# Patient Record
Sex: FEMALE | Race: WHITE | NOT HISPANIC OR LATINO | Employment: FULL TIME | URBAN - METROPOLITAN AREA
[De-identification: names, ages, dates, MRNs, and addresses within clinical notes are randomized per-mention and may not be internally consistent; named-entity substitution may affect disease eponyms.]

---

## 2018-11-29 LAB
EXTERNAL CHLAMYDIA SCREEN: NEGATIVE
EXTERNAL GONORRHEA SCREEN: NEGATIVE
EXTERNAL HEPATITIS B SURFACE ANTIGEN: NEGATIVE
EXTERNAL HEPATITIS C AB: NEGATIVE
EXTERNAL RUBELLA QUALITATIVE: NORMAL
EXTERNAL SYPHILIS RPR SCREEN: NORMAL

## 2018-11-30 LAB — EXTERNAL URINE DRUG SCREEN: NEGATIVE

## 2019-02-18 ENCOUNTER — TRANSCRIBE ORDERS (OUTPATIENT)
Dept: OBSTETRICS AND GYNECOLOGY | Facility: HOSPITAL | Age: 24
End: 2019-02-18

## 2019-02-18 DIAGNOSIS — O28.3 ECHOGENIC INTRACARDIAC FOCUS OF FETUS ON PRENATAL ULTRASOUND: Primary | ICD-10-CM

## 2019-03-18 ENCOUNTER — OFFICE VISIT (OUTPATIENT)
Dept: OBSTETRICS AND GYNECOLOGY | Facility: HOSPITAL | Age: 24
End: 2019-03-18

## 2019-03-18 ENCOUNTER — HOSPITAL ENCOUNTER (OUTPATIENT)
Dept: WOMENS IMAGING | Facility: HOSPITAL | Age: 24
Discharge: HOME | End: 2019-03-18
Admitting: OBSTETRICS & GYNECOLOGY

## 2019-03-18 VITALS
HEIGHT: 64 IN | SYSTOLIC BLOOD PRESSURE: 128 MMHG | BODY MASS INDEX: 24.41 KG/M2 | DIASTOLIC BLOOD PRESSURE: 85 MMHG | WEIGHT: 143 LBS

## 2019-03-18 DIAGNOSIS — O35.BXX0 ECHOGENIC FOCUS OF HEART OF FETUS AFFECTING ANTEPARTUM CARE OF MOTHER, SINGLE OR UNSPECIFIED FETUS: Primary | ICD-10-CM

## 2019-03-18 DIAGNOSIS — O28.3 ECHOGENIC INTRACARDIAC FOCUS OF FETUS ON PRENATAL ULTRASOUND: ICD-10-CM

## 2019-03-18 DIAGNOSIS — Z3A.22 22 WEEKS GESTATION OF PREGNANCY: ICD-10-CM

## 2019-03-18 PROCEDURE — 76811 OB US DETAILED SNGL FETUS: CPT | Performed by: OBSTETRICS & GYNECOLOGY

## 2019-03-18 PROCEDURE — 76811 OB US DETAILED SNGL FETUS: CPT

## 2019-03-18 RX ORDER — PRENATAL WITH FERROUS FUM AND FOLIC ACID 3080; 920; 120; 400; 22; 1.84; 3; 20; 10; 1; 12; 200; 27; 25; 2 [IU]/1; [IU]/1; MG/1; [IU]/1; MG/1; MG/1; MG/1; MG/1; MG/1; MG/1; UG/1; MG/1; MG/1; MG/1; MG/1
1 TABLET ORAL DAILY
COMMUNITY
End: 2022-10-06

## 2019-03-18 NOTE — PROGRESS NOTES
Documentation of the ultasound findings, images, and interpretations with be available in the patient's Viewpoint report located in the Chart Review Imaging tab in [facility].

## 2019-04-20 LAB — EXTERNAL GLUCOSE TOLERANCE TEST FASTING: 102 MG/DL

## 2019-06-20 ENCOUNTER — TRANSCRIBE ORDERS (OUTPATIENT)
Dept: LAB | Facility: HOSPITAL | Age: 24
End: 2019-06-20

## 2019-06-20 ENCOUNTER — LAB (OUTPATIENT)
Dept: LAB | Facility: HOSPITAL | Age: 24
End: 2019-06-20

## 2019-06-20 DIAGNOSIS — Z34.03 ENCOUNTER FOR SUPERVISION OF NORMAL FIRST PREGNANCY IN THIRD TRIMESTER: Primary | ICD-10-CM

## 2019-06-20 DIAGNOSIS — Z34.03 ENCOUNTER FOR SUPERVISION OF NORMAL FIRST PREGNANCY IN THIRD TRIMESTER: ICD-10-CM

## 2019-06-20 PROCEDURE — 87081 CULTURE SCREEN ONLY: CPT

## 2019-06-22 LAB — BACTERIA SPEC AEROBE CULT: NORMAL

## 2019-07-18 NOTE — H&P
23-year-old G1, P0 at 40 weeks and 1 day patient admitted last night for Watters bulb cervical ripening and induction.  Patient was probably in labor on her own when she came in.  We did not have to start Pitocin; but after they remove the Watters bulb she did have episode of fetal bradycardia, probably due to uterine hypertonus.  This recovered and responded well to position change and subcu terbutaline.    Patient progressed without Pitocin to 9 cm and +1 station.  Fetus did develop another episode of bradycardia down to 60.  This did not recover with position change.  We decided to go ahead and do a stat section.    PMH:No Major illness  No surgeries  Allergies: Penicillin    Afeb VSS  Ht- RR  Lungs- Clear  Abd- soft nontender  Uterus- appropriate for gestational age     A/P uterine pregnancy at 40 weeks and 1 day, and in active labor         Episode of fetal bradycardia did not resolve to position change or Brethine         Proceeded with a stat , she is already had an epidural in place and a Watters catheter

## 2019-07-19 ENCOUNTER — HOSPITAL ENCOUNTER (OUTPATIENT)
Dept: LABOR AND DELIVERY | Facility: HOSPITAL | Age: 24
Discharge: HOME | End: 2019-07-19

## 2019-07-19 ENCOUNTER — HOSPITAL ENCOUNTER (INPATIENT)
Facility: HOSPITAL | Age: 24
LOS: 5 days | Discharge: HOME | End: 2019-07-24
Attending: OBSTETRICS & GYNECOLOGY | Admitting: OBSTETRICS & GYNECOLOGY

## 2019-07-19 PROBLEM — Z34.90 PREGNANCY: Status: ACTIVE | Noted: 2019-07-19

## 2019-07-19 LAB
ABO GROUP BLD: NORMAL
BLD GP AB SCN SERPL QL: NEGATIVE
DEPRECATED RDW RBC AUTO: 43.3 FL (ref 37–54)
ERYTHROCYTE [DISTWIDTH] IN BLOOD BY AUTOMATED COUNT: 12.4 % (ref 12.3–15.4)
HCT VFR BLD AUTO: 38 % (ref 34–46.6)
HGB BLD-MCNC: 12.9 G/DL (ref 12–15.9)
MCH RBC QN AUTO: 32.8 PG (ref 26.6–33)
MCHC RBC AUTO-ENTMCNC: 33.9 G/DL (ref 31.5–35.7)
MCV RBC AUTO: 96.7 FL (ref 79–97)
PLATELET # BLD AUTO: 251 10*3/MM3 (ref 140–450)
PMV BLD AUTO: 11.9 FL (ref 6–12)
RBC # BLD AUTO: 3.93 10*6/MM3 (ref 3.77–5.28)
RH BLD: NEGATIVE
T&S EXPIRATION DATE: NORMAL
WBC NRBC COR # BLD: 10.12 10*3/MM3 (ref 3.4–10.8)

## 2019-07-19 PROCEDURE — 86901 BLOOD TYPING SEROLOGIC RH(D): CPT | Performed by: OBSTETRICS & GYNECOLOGY

## 2019-07-19 PROCEDURE — 25010000002 ONDANSETRON PER 1 MG: Performed by: OBSTETRICS & GYNECOLOGY

## 2019-07-19 PROCEDURE — 86900 BLOOD TYPING SEROLOGIC ABO: CPT | Performed by: OBSTETRICS & GYNECOLOGY

## 2019-07-19 PROCEDURE — 86850 RBC ANTIBODY SCREEN: CPT | Performed by: OBSTETRICS & GYNECOLOGY

## 2019-07-19 PROCEDURE — 86900 BLOOD TYPING SEROLOGIC ABO: CPT

## 2019-07-19 PROCEDURE — 59200 INSERT CERVICAL DILATOR: CPT | Performed by: OBSTETRICS & GYNECOLOGY

## 2019-07-19 PROCEDURE — 85027 COMPLETE CBC AUTOMATED: CPT | Performed by: OBSTETRICS & GYNECOLOGY

## 2019-07-19 PROCEDURE — 86901 BLOOD TYPING SEROLOGIC RH(D): CPT

## 2019-07-19 RX ORDER — PROMETHAZINE HYDROCHLORIDE 25 MG/ML
12.5 INJECTION, SOLUTION INTRAMUSCULAR; INTRAVENOUS EVERY 6 HOURS PRN
Status: CANCELLED | OUTPATIENT
Start: 2019-07-19

## 2019-07-19 RX ORDER — MISOPROSTOL 100 MCG
25 TABLET ORAL ONCE
Status: CANCELLED | OUTPATIENT
Start: 2019-07-19

## 2019-07-19 RX ORDER — SODIUM CHLORIDE 0.9 % (FLUSH) 0.9 %
3-10 SYRINGE (ML) INJECTION AS NEEDED
Status: CANCELLED | OUTPATIENT
Start: 2019-07-19

## 2019-07-19 RX ORDER — PROMETHAZINE HYDROCHLORIDE 12.5 MG/1
12.5 TABLET ORAL EVERY 6 HOURS PRN
Status: CANCELLED | OUTPATIENT
Start: 2019-07-19

## 2019-07-19 RX ORDER — ONDANSETRON 2 MG/ML
4 INJECTION INTRAMUSCULAR; INTRAVENOUS EVERY 6 HOURS PRN
Status: DISCONTINUED | OUTPATIENT
Start: 2019-07-19 | End: 2019-07-20

## 2019-07-19 RX ORDER — SODIUM CHLORIDE 0.9 % (FLUSH) 0.9 %
3 SYRINGE (ML) INJECTION EVERY 12 HOURS SCHEDULED
Status: CANCELLED | OUTPATIENT
Start: 2019-07-19

## 2019-07-19 RX ORDER — LIDOCAINE HYDROCHLORIDE 10 MG/ML
5 INJECTION, SOLUTION EPIDURAL; INFILTRATION; INTRACAUDAL; PERINEURAL AS NEEDED
Status: CANCELLED | OUTPATIENT
Start: 2019-07-19

## 2019-07-19 RX ORDER — OXYTOCIN-SODIUM CHLORIDE 0.9% IV SOLN 30 UNIT/500ML 30-0.9/5 UT/ML-%
2-30 SOLUTION INTRAVENOUS
Status: CANCELLED | OUTPATIENT
Start: 2019-07-20

## 2019-07-19 RX ORDER — IBUPROFEN 600 MG/1
600 TABLET ORAL EVERY 6 HOURS PRN
Status: CANCELLED | OUTPATIENT
Start: 2019-07-19

## 2019-07-19 RX ORDER — SODIUM CHLORIDE 0.9 % (FLUSH) 0.9 %
3 SYRINGE (ML) INJECTION EVERY 12 HOURS SCHEDULED
Status: DISCONTINUED | OUTPATIENT
Start: 2019-07-19 | End: 2019-07-21

## 2019-07-19 RX ORDER — PROMETHAZINE HYDROCHLORIDE 25 MG/ML
12.5 INJECTION, SOLUTION INTRAMUSCULAR; INTRAVENOUS EVERY 6 HOURS PRN
Status: DISCONTINUED | OUTPATIENT
Start: 2019-07-19 | End: 2019-07-20 | Stop reason: SDUPTHER

## 2019-07-19 RX ORDER — PROMETHAZINE HYDROCHLORIDE 12.5 MG/1
12.5 SUPPOSITORY RECTAL EVERY 6 HOURS PRN
Status: DISCONTINUED | OUTPATIENT
Start: 2019-07-19 | End: 2019-07-20 | Stop reason: SDUPTHER

## 2019-07-19 RX ORDER — PROMETHAZINE HYDROCHLORIDE 12.5 MG/1
12.5 SUPPOSITORY RECTAL EVERY 6 HOURS PRN
Status: CANCELLED | OUTPATIENT
Start: 2019-07-19

## 2019-07-19 RX ORDER — MISOPROSTOL 100 MCG
25 TABLET ORAL ONCE
Status: DISCONTINUED | OUTPATIENT
Start: 2019-07-19 | End: 2019-07-19

## 2019-07-19 RX ORDER — HYDROCODONE BITARTRATE AND ACETAMINOPHEN 5; 325 MG/1; MG/1
2 TABLET ORAL EVERY 4 HOURS PRN
Status: CANCELLED | OUTPATIENT
Start: 2019-07-19 | End: 2019-07-29

## 2019-07-19 RX ORDER — OXYTOCIN-SODIUM CHLORIDE 0.9% IV SOLN 30 UNIT/500ML 30-0.9/5 UT/ML-%
2-30 SOLUTION INTRAVENOUS
Status: DISCONTINUED | OUTPATIENT
Start: 2019-07-20 | End: 2019-07-23

## 2019-07-19 RX ORDER — TERBUTALINE SULFATE 1 MG/ML
0.25 INJECTION, SOLUTION SUBCUTANEOUS ONCE
Status: COMPLETED | OUTPATIENT
Start: 2019-07-20 | End: 2019-07-19

## 2019-07-19 RX ORDER — PROMETHAZINE HYDROCHLORIDE 12.5 MG/1
12.5 TABLET ORAL EVERY 6 HOURS PRN
Status: DISCONTINUED | OUTPATIENT
Start: 2019-07-19 | End: 2019-07-20 | Stop reason: SDUPTHER

## 2019-07-19 RX ORDER — SODIUM CHLORIDE 0.9 % (FLUSH) 0.9 %
3-10 SYRINGE (ML) INJECTION AS NEEDED
Status: DISCONTINUED | OUTPATIENT
Start: 2019-07-19 | End: 2019-07-21

## 2019-07-19 RX ORDER — LIDOCAINE HYDROCHLORIDE 10 MG/ML
5 INJECTION, SOLUTION EPIDURAL; INFILTRATION; INTRACAUDAL; PERINEURAL AS NEEDED
Status: DISCONTINUED | OUTPATIENT
Start: 2019-07-19 | End: 2019-07-23

## 2019-07-19 RX ORDER — SODIUM CHLORIDE, SODIUM LACTATE, POTASSIUM CHLORIDE, CALCIUM CHLORIDE 600; 310; 30; 20 MG/100ML; MG/100ML; MG/100ML; MG/100ML
125 INJECTION, SOLUTION INTRAVENOUS CONTINUOUS
Status: DISCONTINUED | OUTPATIENT
Start: 2019-07-19 | End: 2019-07-23

## 2019-07-19 RX ADMIN — ONDANSETRON 4 MG: 2 INJECTION INTRAMUSCULAR; INTRAVENOUS at 22:49

## 2019-07-19 RX ADMIN — SODIUM CHLORIDE, POTASSIUM CHLORIDE, SODIUM LACTATE AND CALCIUM CHLORIDE 125 ML/HR: 600; 310; 30; 20 INJECTION, SOLUTION INTRAVENOUS at 21:28

## 2019-07-19 RX ADMIN — TERBUTALINE SULFATE 0.25 MG: 1 INJECTION SUBCUTANEOUS at 22:22

## 2019-07-19 RX ADMIN — SODIUM CHLORIDE, PRESERVATIVE FREE 3 ML: 5 INJECTION INTRAVENOUS at 21:29

## 2019-07-20 ENCOUNTER — ANESTHESIA (OUTPATIENT)
Dept: LABOR AND DELIVERY | Facility: HOSPITAL | Age: 24
End: 2019-07-20

## 2019-07-20 ENCOUNTER — ANESTHESIA EVENT (OUTPATIENT)
Dept: LABOR AND DELIVERY | Facility: HOSPITAL | Age: 24
End: 2019-07-20

## 2019-07-20 ENCOUNTER — APPOINTMENT (OUTPATIENT)
Dept: GENERAL RADIOLOGY | Facility: HOSPITAL | Age: 24
End: 2019-07-20

## 2019-07-20 LAB
ABO GROUP BLD: NORMAL
ALBUMIN SERPL-MCNC: 2.6 G/DL (ref 3.5–5.2)
ALBUMIN/GLOB SERPL: 1 G/DL
ALP SERPL-CCNC: 133 U/L (ref 39–117)
ALT SERPL W P-5'-P-CCNC: 10 U/L (ref 1–33)
ANION GAP SERPL CALCULATED.3IONS-SCNC: 12 MMOL/L (ref 5–15)
ARTERIAL PATENCY WRIST A: ABNORMAL
AST SERPL-CCNC: 19 U/L (ref 1–32)
ATMOSPHERIC PRESS: ABNORMAL MMHG
ATMOSPHERIC PRESS: ABNORMAL MMHG
BASE EXCESS BLDA CALC-SCNC: -11.5 MMOL/L (ref 0–2)
BASE EXCESS BLDCOA CALC-SCNC: -15.9 MMOL/L (ref 0–2)
BASOPHILS # BLD AUTO: 0.02 10*3/MM3 (ref 0–0.2)
BASOPHILS NFR BLD AUTO: 0.1 % (ref 0–1.5)
BDY SITE: ABNORMAL
BILIRUB SERPL-MCNC: 0.2 MG/DL (ref 0.2–1.2)
BODY TEMPERATURE: 37 C
BODY TEMPERATURE: 37 C
BUN BLD-MCNC: 5 MG/DL (ref 6–20)
BUN/CREAT SERPL: 12.8 (ref 7–25)
CALCIUM SPEC-SCNC: 8.6 MG/DL (ref 8.6–10.5)
CHLORIDE SERPL-SCNC: 103 MMOL/L (ref 98–107)
CO2 BLDA-SCNC: 19.3 MMOL/L (ref 23–27)
CO2 BLDA-SCNC: 20.9 MMOL/L (ref 23–27)
CO2 SERPL-SCNC: 22 MMOL/L (ref 22–29)
COHGB MFR BLD: 1.3 % (ref 0–2)
CREAT BLD-MCNC: 0.39 MG/DL (ref 0.57–1)
DEPRECATED RDW RBC AUTO: 43.8 FL (ref 37–54)
DEPRECATED RDW RBC AUTO: 44.1 FL (ref 37–54)
DEPRECATED RDW RBC AUTO: 44.7 FL (ref 37–54)
EOSINOPHIL # BLD AUTO: 0 10*3/MM3 (ref 0–0.4)
EOSINOPHIL NFR BLD AUTO: 0 % (ref 0.3–6.2)
ERYTHROCYTE [DISTWIDTH] IN BLOOD BY AUTOMATED COUNT: 12.4 % (ref 12.3–15.4)
ERYTHROCYTE [DISTWIDTH] IN BLOOD BY AUTOMATED COUNT: 12.5 % (ref 12.3–15.4)
ERYTHROCYTE [DISTWIDTH] IN BLOOD BY AUTOMATED COUNT: 12.5 % (ref 12.3–15.4)
FETAL BLEED: NEGATIVE
GFR SERPL CREATININE-BSD FRML MDRD: >150 ML/MIN/1.73
GLOBULIN UR ELPH-MCNC: 2.7 GM/DL
GLUCOSE BLD-MCNC: 94 MG/DL (ref 65–99)
HCO3 BLDA-SCNC: 17.7 MMOL/L (ref 20–26)
HCO3 BLDCOA-SCNC: 18.3 MMOL/L (ref 16.9–20.5)
HCT VFR BLD AUTO: 31.1 % (ref 34–46.6)
HCT VFR BLD AUTO: 31.3 % (ref 34–46.6)
HCT VFR BLD AUTO: 31.5 % (ref 34–46.6)
HCT VFR BLD CALC: 53.1 %
HGB BLD-MCNC: 10.5 G/DL (ref 12–15.9)
HGB BLDA-MCNC: 17 G/DL (ref 14–18)
HGB BLDA-MCNC: 17.3 G/DL (ref 14–18)
HOROWITZ INDEX BLD+IHG-RTO: 21 %
HOROWITZ INDEX BLD+IHG-RTO: 21 %
IMM GRANULOCYTES # BLD AUTO: 0.16 10*3/MM3 (ref 0–0.05)
IMM GRANULOCYTES NFR BLD AUTO: 0.8 % (ref 0–0.5)
LYMPHOCYTES # BLD AUTO: 1.38 10*3/MM3 (ref 0.7–3.1)
LYMPHOCYTES NFR BLD AUTO: 6.8 % (ref 19.6–45.3)
Lab: ABNORMAL
Lab: ABNORMAL
MCH RBC QN AUTO: 32.3 PG (ref 26.6–33)
MCH RBC QN AUTO: 33.4 PG (ref 26.6–33)
MCH RBC QN AUTO: 34.1 PG (ref 26.6–33)
MCHC RBC AUTO-ENTMCNC: 33.3 G/DL (ref 31.5–35.7)
MCHC RBC AUTO-ENTMCNC: 33.5 G/DL (ref 31.5–35.7)
MCHC RBC AUTO-ENTMCNC: 33.8 G/DL (ref 31.5–35.7)
MCV RBC AUTO: 100.3 FL (ref 79–97)
MCV RBC AUTO: 101 FL (ref 79–97)
MCV RBC AUTO: 96.3 FL (ref 79–97)
METHGB BLD QL: 1.2 % (ref 0–1.5)
MODALITY: ABNORMAL
MODALITY: ABNORMAL
MONOCYTES # BLD AUTO: 1.36 10*3/MM3 (ref 0.1–0.9)
MONOCYTES NFR BLD AUTO: 6.7 % (ref 5–12)
NEUTROPHILS # BLD AUTO: 17.34 10*3/MM3 (ref 1.7–7)
NEUTROPHILS NFR BLD AUTO: 85.6 % (ref 42.7–76)
NOTE: ABNORMAL
NOTE: ABNORMAL
NOTIFIED BY: ABNORMAL
NOTIFIED BY: ABNORMAL
NOTIFIED WHO: ABNORMAL
NOTIFIED WHO: ABNORMAL
NRBC BLD AUTO-RTO: 0 /100 WBC (ref 0–0.2)
NUMBER OF DOSES: NORMAL
OXYHGB MFR BLDV: 75.6 % (ref 94–99)
PCO2 BLDA: 51 MM HG (ref 35–45)
PCO2 BLDCOA: 83.5 MMHG (ref 43.3–54.9)
PCO2 TEMP ADJ BLD: 51 MM HG (ref 35–45)
PH BLDA: 7.15 PH UNITS (ref 7.35–7.45)
PH BLDCOA: 6.95 PH UNITS (ref 7.22–7.3)
PH, TEMP CORRECTED: 7.15 PH UNITS
PLATELET # BLD AUTO: 195 10*3/MM3 (ref 140–450)
PLATELET # BLD AUTO: 199 10*3/MM3 (ref 140–450)
PLATELET # BLD AUTO: 201 10*3/MM3 (ref 140–450)
PMV BLD AUTO: 11.7 FL (ref 6–12)
PMV BLD AUTO: 12 FL (ref 6–12)
PMV BLD AUTO: 12.4 FL (ref 6–12)
PO2 BLDA: 42.9 MM HG (ref 83–108)
PO2 BLDCOA: 52.3 MMHG (ref 11.5–43.3)
PO2 TEMP ADJ BLD: 42.9 MM HG (ref 83–108)
POTASSIUM BLD-SCNC: 4 MMOL/L (ref 3.5–5.2)
PROT SERPL-MCNC: 5.3 G/DL (ref 6–8.5)
RBC # BLD AUTO: 3.08 10*6/MM3 (ref 3.77–5.28)
RBC # BLD AUTO: 3.14 10*6/MM3 (ref 3.77–5.28)
RBC # BLD AUTO: 3.25 10*6/MM3 (ref 3.77–5.28)
RH BLD: NEGATIVE
SAO2 % BLDCOA: 35.9 %
SODIUM BLD-SCNC: 137 MMOL/L (ref 136–145)
VENTILATOR MODE: ABNORMAL
WBC NRBC COR # BLD: 18.28 10*3/MM3 (ref 3.4–10.8)
WBC NRBC COR # BLD: 20.26 10*3/MM3 (ref 3.4–10.8)
WBC NRBC COR # BLD: 20.31 10*3/MM3 (ref 3.4–10.8)

## 2019-07-20 PROCEDURE — 59025 FETAL NON-STRESS TEST: CPT

## 2019-07-20 PROCEDURE — 85025 COMPLETE CBC W/AUTO DIFF WBC: CPT | Performed by: OBSTETRICS & GYNECOLOGY

## 2019-07-20 PROCEDURE — 25010000002 ONDANSETRON PER 1 MG: Performed by: ANESTHESIOLOGY

## 2019-07-20 PROCEDURE — 25010000002 TERBUTALINE PER 1 MG: Performed by: OBSTETRICS & GYNECOLOGY

## 2019-07-20 PROCEDURE — 74018 RADEX ABDOMEN 1 VIEW: CPT

## 2019-07-20 PROCEDURE — 85027 COMPLETE CBC AUTOMATED: CPT | Performed by: OBSTETRICS & GYNECOLOGY

## 2019-07-20 PROCEDURE — 82805 BLOOD GASES W/O2 SATURATION: CPT

## 2019-07-20 PROCEDURE — C1755 CATHETER, INTRASPINAL: HCPCS

## 2019-07-20 PROCEDURE — 25010000003 MORPHINE PER 10 MG: Performed by: ANESTHESIOLOGY

## 2019-07-20 PROCEDURE — 86900 BLOOD TYPING SEROLOGIC ABO: CPT | Performed by: OBSTETRICS & GYNECOLOGY

## 2019-07-20 PROCEDURE — 25010000002 FENTANYL CITRATE (PF) 100 MCG/2ML SOLUTION: Performed by: ANESTHESIOLOGY

## 2019-07-20 PROCEDURE — 51702 INSERT TEMP BLADDER CATH: CPT

## 2019-07-20 PROCEDURE — 36600 WITHDRAWAL OF ARTERIAL BLOOD: CPT

## 2019-07-20 PROCEDURE — 86901 BLOOD TYPING SEROLOGIC RH(D): CPT | Performed by: OBSTETRICS & GYNECOLOGY

## 2019-07-20 PROCEDURE — 25010000002 CHLOROPROCAINE HCL (PF) 3 % SOLUTION: Performed by: ANESTHESIOLOGY

## 2019-07-20 PROCEDURE — 85461 HEMOGLOBIN FETAL: CPT | Performed by: OBSTETRICS & GYNECOLOGY

## 2019-07-20 PROCEDURE — 88307 TISSUE EXAM BY PATHOLOGIST: CPT | Performed by: OBSTETRICS & GYNECOLOGY

## 2019-07-20 PROCEDURE — 80053 COMPREHEN METABOLIC PANEL: CPT | Performed by: OBSTETRICS & GYNECOLOGY

## 2019-07-20 PROCEDURE — C1755 CATHETER, INTRASPINAL: HCPCS | Performed by: ANESTHESIOLOGY

## 2019-07-20 PROCEDURE — 25010000002 MIDAZOLAM PER 1 MG: Performed by: ANESTHESIOLOGY

## 2019-07-20 PROCEDURE — 25010000002 RHO D IMMUNE GLOBULIN 1500 UNIT/2ML SOLUTION PREFILLED SYRINGE: Performed by: OBSTETRICS & GYNECOLOGY

## 2019-07-20 PROCEDURE — 25010000003 CEFAZOLIN IN DEXTROSE 2-4 GM/100ML-% SOLUTION: Performed by: OBSTETRICS & GYNECOLOGY

## 2019-07-20 RX ORDER — ONDANSETRON 4 MG/1
4 TABLET, FILM COATED ORAL EVERY 8 HOURS PRN
Status: DISCONTINUED | OUTPATIENT
Start: 2019-07-20 | End: 2019-07-24 | Stop reason: HOSPADM

## 2019-07-20 RX ORDER — IBUPROFEN 600 MG/1
600 TABLET ORAL EVERY 6 HOURS PRN
Status: DISCONTINUED | OUTPATIENT
Start: 2019-07-20 | End: 2019-07-24 | Stop reason: HOSPADM

## 2019-07-20 RX ORDER — ONDANSETRON 2 MG/ML
4 INJECTION INTRAMUSCULAR; INTRAVENOUS ONCE
Status: DISCONTINUED | OUTPATIENT
Start: 2019-07-20 | End: 2019-07-23

## 2019-07-20 RX ORDER — MIDAZOLAM HYDROCHLORIDE 1 MG/ML
INJECTION INTRAMUSCULAR; INTRAVENOUS AS NEEDED
Status: DISCONTINUED | OUTPATIENT
Start: 2019-07-20 | End: 2019-07-20 | Stop reason: SURG

## 2019-07-20 RX ORDER — DOCUSATE SODIUM 100 MG/1
100 CAPSULE, LIQUID FILLED ORAL 2 TIMES DAILY PRN
Status: DISCONTINUED | OUTPATIENT
Start: 2019-07-20 | End: 2019-07-24 | Stop reason: HOSPADM

## 2019-07-20 RX ORDER — METOCLOPRAMIDE HYDROCHLORIDE 5 MG/ML
10 INJECTION INTRAMUSCULAR; INTRAVENOUS ONCE AS NEEDED
Status: DISCONTINUED | OUTPATIENT
Start: 2019-07-20 | End: 2019-07-24 | Stop reason: HOSPADM

## 2019-07-20 RX ORDER — OXYTOCIN 10 [USP'U]/ML
INJECTION, SOLUTION INTRAMUSCULAR; INTRAVENOUS AS NEEDED
Status: DISCONTINUED | OUTPATIENT
Start: 2019-07-20 | End: 2019-07-20 | Stop reason: SURG

## 2019-07-20 RX ORDER — DIPHENHYDRAMINE HYDROCHLORIDE 50 MG/ML
12.5 INJECTION INTRAMUSCULAR; INTRAVENOUS EVERY 8 HOURS PRN
Status: DISCONTINUED | OUTPATIENT
Start: 2019-07-20 | End: 2019-07-24 | Stop reason: HOSPADM

## 2019-07-20 RX ORDER — CEFAZOLIN SODIUM 2 G/100ML
2 INJECTION, SOLUTION INTRAVENOUS EVERY 8 HOURS SCHEDULED
Status: DISCONTINUED | OUTPATIENT
Start: 2019-07-20 | End: 2019-07-20 | Stop reason: SDUPTHER

## 2019-07-20 RX ORDER — MORPHINE SULFATE 0.5 MG/ML
INJECTION, SOLUTION EPIDURAL; INTRATHECAL; INTRAVENOUS AS NEEDED
Status: DISCONTINUED | OUTPATIENT
Start: 2019-07-20 | End: 2019-07-20 | Stop reason: SURG

## 2019-07-20 RX ORDER — HYDROCODONE BITARTRATE AND ACETAMINOPHEN 7.5; 325 MG/1; MG/1
1 TABLET ORAL EVERY 4 HOURS PRN
Status: DISCONTINUED | OUTPATIENT
Start: 2019-07-20 | End: 2019-07-24 | Stop reason: HOSPADM

## 2019-07-20 RX ORDER — TERBUTALINE SULFATE 1 MG/ML
0.25 INJECTION, SOLUTION SUBCUTANEOUS ONCE
Status: COMPLETED | OUTPATIENT
Start: 2019-07-20 | End: 2019-07-20

## 2019-07-20 RX ORDER — ACETAMINOPHEN 325 MG/1
650 TABLET ORAL ONCE AS NEEDED
Status: DISCONTINUED | OUTPATIENT
Start: 2019-07-20 | End: 2019-07-24 | Stop reason: HOSPADM

## 2019-07-20 RX ORDER — ONDANSETRON 2 MG/ML
INJECTION INTRAMUSCULAR; INTRAVENOUS AS NEEDED
Status: DISCONTINUED | OUTPATIENT
Start: 2019-07-20 | End: 2019-07-20 | Stop reason: SURG

## 2019-07-20 RX ORDER — PROMETHAZINE HYDROCHLORIDE 12.5 MG/1
12.5 TABLET ORAL EVERY 6 HOURS PRN
Status: DISCONTINUED | OUTPATIENT
Start: 2019-07-20 | End: 2019-07-24 | Stop reason: HOSPADM

## 2019-07-20 RX ORDER — LIDOCAINE HYDROCHLORIDE AND EPINEPHRINE 15; 5 MG/ML; UG/ML
INJECTION, SOLUTION EPIDURAL AS NEEDED
Status: DISCONTINUED | OUTPATIENT
Start: 2019-07-20 | End: 2019-07-20 | Stop reason: SURG

## 2019-07-20 RX ORDER — PROMETHAZINE HYDROCHLORIDE 25 MG/ML
12.5 INJECTION, SOLUTION INTRAMUSCULAR; INTRAVENOUS EVERY 6 HOURS PRN
Status: DISCONTINUED | OUTPATIENT
Start: 2019-07-20 | End: 2019-07-24 | Stop reason: HOSPADM

## 2019-07-20 RX ORDER — EPHEDRINE SULFATE/0.9% NACL/PF 25 MG/5 ML
10 SYRINGE (ML) INTRAVENOUS
Status: DISCONTINUED | OUTPATIENT
Start: 2019-07-20 | End: 2019-07-23

## 2019-07-20 RX ORDER — HYDROMORPHONE HYDROCHLORIDE 1 MG/ML
0.5 INJECTION, SOLUTION INTRAMUSCULAR; INTRAVENOUS; SUBCUTANEOUS
Status: ACTIVE | OUTPATIENT
Start: 2019-07-20 | End: 2019-07-21

## 2019-07-20 RX ORDER — LIDOCAINE HYDROCHLORIDE AND EPINEPHRINE 20; 5 MG/ML; UG/ML
INJECTION, SOLUTION EPIDURAL; INFILTRATION; INTRACAUDAL; PERINEURAL AS NEEDED
Status: DISCONTINUED | OUTPATIENT
Start: 2019-07-20 | End: 2019-07-20 | Stop reason: SURG

## 2019-07-20 RX ORDER — FAMOTIDINE 10 MG/ML
INJECTION, SOLUTION INTRAVENOUS AS NEEDED
Status: DISCONTINUED | OUTPATIENT
Start: 2019-07-20 | End: 2019-07-20 | Stop reason: SURG

## 2019-07-20 RX ORDER — IBUPROFEN 600 MG/1
600 TABLET ORAL EVERY 6 HOURS PRN
Status: DISCONTINUED | OUTPATIENT
Start: 2019-07-20 | End: 2019-07-20

## 2019-07-20 RX ORDER — CHLOROPROCAINE HYDROCHLORIDE 30 MG/ML
INJECTION, SOLUTION EPIDURAL; INFILTRATION; INTRACAUDAL; PERINEURAL AS NEEDED
Status: DISCONTINUED | OUTPATIENT
Start: 2019-07-20 | End: 2019-07-20 | Stop reason: SURG

## 2019-07-20 RX ORDER — ONDANSETRON 2 MG/ML
4 INJECTION INTRAMUSCULAR; INTRAVENOUS ONCE AS NEEDED
Status: DISCONTINUED | OUTPATIENT
Start: 2019-07-20 | End: 2019-07-24 | Stop reason: HOSPADM

## 2019-07-20 RX ORDER — CEFAZOLIN SODIUM 2 G/100ML
2 INJECTION, SOLUTION INTRAVENOUS EVERY 8 HOURS SCHEDULED
Status: COMPLETED | OUTPATIENT
Start: 2019-07-20 | End: 2019-07-20

## 2019-07-20 RX ORDER — SODIUM CHLORIDE, SODIUM LACTATE, POTASSIUM CHLORIDE, CALCIUM CHLORIDE 600; 310; 30; 20 MG/100ML; MG/100ML; MG/100ML; MG/100ML
INJECTION, SOLUTION INTRAVENOUS CONTINUOUS PRN
Status: DISCONTINUED | OUTPATIENT
Start: 2019-07-20 | End: 2019-07-20 | Stop reason: SURG

## 2019-07-20 RX ORDER — HYDROCODONE BITARTRATE AND ACETAMINOPHEN 5; 325 MG/1; MG/1
2 TABLET ORAL EVERY 4 HOURS PRN
Status: DISCONTINUED | OUTPATIENT
Start: 2019-07-20 | End: 2019-07-24 | Stop reason: HOSPADM

## 2019-07-20 RX ORDER — FENTANYL CITRATE 50 UG/ML
INJECTION, SOLUTION INTRAMUSCULAR; INTRAVENOUS AS NEEDED
Status: DISCONTINUED | OUTPATIENT
Start: 2019-07-20 | End: 2019-07-20 | Stop reason: SURG

## 2019-07-20 RX ORDER — PROMETHAZINE HYDROCHLORIDE 12.5 MG/1
12.5 SUPPOSITORY RECTAL EVERY 6 HOURS PRN
Status: DISCONTINUED | OUTPATIENT
Start: 2019-07-20 | End: 2019-07-24 | Stop reason: HOSPADM

## 2019-07-20 RX ORDER — IBUPROFEN 600 MG/1
600 TABLET ORAL ONCE AS NEEDED
Status: COMPLETED | OUTPATIENT
Start: 2019-07-20 | End: 2019-07-20

## 2019-07-20 RX ORDER — TRISODIUM CITRATE DIHYDRATE AND CITRIC ACID MONOHYDRATE 500; 334 MG/5ML; MG/5ML
30 SOLUTION ORAL ONCE
Status: DISCONTINUED | OUTPATIENT
Start: 2019-07-20 | End: 2019-07-23

## 2019-07-20 RX ADMIN — FAMOTIDINE 20 MG: 10 INJECTION, SOLUTION INTRAVENOUS at 04:58

## 2019-07-20 RX ADMIN — HUMAN RHO(D) IMMUNE GLOBULIN 1500 UNITS: 1500 SOLUTION INTRAMUSCULAR; INTRAVENOUS at 22:05

## 2019-07-20 RX ADMIN — Medication 10 MG: at 01:30

## 2019-07-20 RX ADMIN — LIDOCAINE HYDROCHLORIDE AND EPINEPHRINE 3 ML: 15; 5 INJECTION, SOLUTION EPIDURAL at 01:05

## 2019-07-20 RX ADMIN — MIDAZOLAM HYDROCHLORIDE 2.5 MG: 1 INJECTION, SOLUTION INTRAMUSCULAR; INTRAVENOUS at 05:00

## 2019-07-20 RX ADMIN — OXYTOCIN 3 UNITS: 10 INJECTION, SOLUTION INTRAMUSCULAR; INTRAVENOUS at 04:44

## 2019-07-20 RX ADMIN — HYDROCODONE BITARTRATE AND ACETAMINOPHEN 2 TABLET: 5; 325 TABLET ORAL at 18:14

## 2019-07-20 RX ADMIN — SODIUM CHLORIDE, POTASSIUM CHLORIDE, SODIUM LACTATE AND CALCIUM CHLORIDE 125 ML/HR: 600; 310; 30; 20 INJECTION, SOLUTION INTRAVENOUS at 02:09

## 2019-07-20 RX ADMIN — CEFAZOLIN SODIUM 2 G: 2 INJECTION, SOLUTION INTRAVENOUS at 07:45

## 2019-07-20 RX ADMIN — TERBUTALINE SULFATE 0.25 MG: 1 INJECTION, SOLUTION SUBCUTANEOUS at 04:30

## 2019-07-20 RX ADMIN — LIDOCAINE HYDROCHLORIDE,EPINEPHRINE BITARTRATE 2 ML: 20; .005 INJECTION, SOLUTION EPIDURAL; INFILTRATION; INTRACAUDAL; PERINEURAL at 01:07

## 2019-07-20 RX ADMIN — IBUPROFEN 600 MG: 600 TABLET ORAL at 12:55

## 2019-07-20 RX ADMIN — MIDAZOLAM HYDROCHLORIDE 2.5 MG: 1 INJECTION, SOLUTION INTRAMUSCULAR; INTRAVENOUS at 05:16

## 2019-07-20 RX ADMIN — CEFAZOLIN SODIUM 2 G: 2 INJECTION, SOLUTION INTRAVENOUS at 22:26

## 2019-07-20 RX ADMIN — IBUPROFEN 600 MG: 600 TABLET ORAL at 18:14

## 2019-07-20 RX ADMIN — Medication 14 ML/HR: at 01:11

## 2019-07-20 RX ADMIN — OXYTOCIN 20 UNITS: 10 INJECTION, SOLUTION INTRAMUSCULAR; INTRAVENOUS at 04:57

## 2019-07-20 RX ADMIN — FENTANYL CITRATE 100 MCG: 50 INJECTION, SOLUTION INTRAMUSCULAR; INTRAVENOUS at 05:18

## 2019-07-20 RX ADMIN — LIDOCAINE HYDROCHLORIDE,EPINEPHRINE BITARTRATE 5 ML: 20; .005 INJECTION, SOLUTION EPIDURAL; INFILTRATION; INTRACAUDAL; PERINEURAL at 04:45

## 2019-07-20 RX ADMIN — CEFAZOLIN SODIUM 2 G: 2 INJECTION, SOLUTION INTRAVENOUS at 13:58

## 2019-07-20 RX ADMIN — MORPHINE SULFATE 4 MG: 0.5 INJECTION, SOLUTION EPIDURAL; INTRATHECAL; INTRAVENOUS at 04:51

## 2019-07-20 RX ADMIN — HYDROCODONE BITARTRATE AND ACETAMINOPHEN 2 TABLET: 5; 325 TABLET ORAL at 13:58

## 2019-07-20 RX ADMIN — LIDOCAINE HYDROCHLORIDE AND EPINEPHRINE 3 ML: 15; 5 INJECTION, SOLUTION EPIDURAL at 01:03

## 2019-07-20 RX ADMIN — FENTANYL CITRATE 100 MCG: 50 INJECTION, SOLUTION INTRAMUSCULAR; INTRAVENOUS at 01:11

## 2019-07-20 RX ADMIN — SODIUM CHLORIDE, POTASSIUM CHLORIDE, SODIUM LACTATE AND CALCIUM CHLORIDE 1500 ML: 600; 310; 30; 20 INJECTION, SOLUTION INTRAVENOUS at 00:50

## 2019-07-20 RX ADMIN — CHLOROPROCAINE HYDROCHLORIDE 5 ML: 30 INJECTION, SOLUTION EPIDURAL; INFILTRATION; INTRACAUDAL; PERINEURAL at 04:35

## 2019-07-20 RX ADMIN — ONDANSETRON 4 MG: 2 INJECTION INTRAMUSCULAR; INTRAVENOUS at 04:58

## 2019-07-20 RX ADMIN — SODIUM CHLORIDE, POTASSIUM CHLORIDE, SODIUM LACTATE AND CALCIUM CHLORIDE 125 ML/HR: 600; 310; 30; 20 INJECTION, SOLUTION INTRAVENOUS at 07:00

## 2019-07-20 RX ADMIN — OXYTOCIN 3 UNITS: 10 INJECTION, SOLUTION INTRAMUSCULAR; INTRAVENOUS at 04:47

## 2019-07-20 RX ADMIN — OXYTOCIN 10 UNITS: 10 INJECTION, SOLUTION INTRAMUSCULAR; INTRAVENOUS at 04:50

## 2019-07-20 RX ADMIN — SODIUM CHLORIDE, POTASSIUM CHLORIDE, SODIUM LACTATE AND CALCIUM CHLORIDE: 600; 310; 30; 20 INJECTION, SOLUTION INTRAVENOUS at 04:32

## 2019-07-20 RX ADMIN — LIDOCAINE HYDROCHLORIDE,EPINEPHRINE BITARTRATE 4 ML: 20; .005 INJECTION, SOLUTION EPIDURAL; INFILTRATION; INTRACAUDAL; PERINEURAL at 01:01

## 2019-07-20 RX ADMIN — MORPHINE SULFATE 1 MG: 0.5 INJECTION, SOLUTION EPIDURAL; INTRATHECAL; INTRAVENOUS at 04:55

## 2019-07-20 RX ADMIN — IBUPROFEN 600 MG: 600 TABLET ORAL at 07:03

## 2019-07-20 RX ADMIN — LIDOCAINE HYDROCHLORIDE,EPINEPHRINE BITARTRATE 10 ML: 20; .005 INJECTION, SOLUTION EPIDURAL; INFILTRATION; INTRACAUDAL; PERINEURAL at 04:33

## 2019-07-20 RX ADMIN — HYDROCODONE BITARTRATE AND ACETAMINOPHEN 2 TABLET: 5; 325 TABLET ORAL at 22:05

## 2019-07-20 RX ADMIN — HYDROCODONE BITARTRATE AND ACETAMINOPHEN 2 TABLET: 5; 325 TABLET ORAL at 10:10

## 2019-07-20 NOTE — ANESTHESIA PREPROCEDURE EVALUATION
Anesthesia Evaluation     Patient summary reviewed and Nursing notes reviewed   NPO Solid Status: > 6 hours  NPO Liquid Status: < 2 hours           Airway   Mallampati: I  TM distance: >3 FB  Neck ROM: full  No difficulty expected  Dental      Pulmonary - negative pulmonary ROS   Cardiovascular - negative cardio ROS        Neuro/Psych- negative ROS  GI/Hepatic/Renal/Endo - negative ROS     Musculoskeletal (-) negative ROS    Abdominal    Substance History - negative use     OB/GYN    (+) Pregnant,         Other                        Anesthesia Plan    ASA 2     epidural     Anesthetic plan, all risks, benefits, and alternatives have been provided, discussed and informed consent has been obtained with: patient.  Use of blood products discussed with patient .

## 2019-07-20 NOTE — ANESTHESIA POSTPROCEDURE EVALUATION
Patient: Seema Barr    Procedure Summary     Date:  19 Room / Location:  [facility] LABOR DELIVERY 2 /  [facility]    Anesthesia Start:  48 Anesthesia Stop:  529    Procedure:   SECTION PRIMARY (N/A Abdomen) Diagnosis:      Surgeon:  Apollo Grant MD Provider:  Jhon Cooley DO    Anesthesia Type:  epidural ASA Status:  2          Anesthesia Type: epidural  Last vitals  BP   104/62   Temp   98.1   Pulse   138   Resp   20   SpO2   97%     Post Anesthesia Care and Evaluation    Patient location during evaluation: bedside  Patient participation: complete - patient participated  Level of consciousness: awake  Pain score: 0  Pain management: satisfactory to patient  Airway patency: patent  Anesthetic complications: No anesthetic complications  PONV Status: none  Cardiovascular status: acceptable and hemodynamically stable  Respiratory status: acceptable  Hydration status: acceptable

## 2019-07-20 NOTE — ANESTHESIA PROCEDURE NOTES
Labor Epidural      Patient reassessed immediately prior to procedure    Patient location during procedure: OB  Performed By  Anesthesiologist: Jhon Cooley DO  Preanesthetic Checklist  Completed: patient identified, site marked, surgical consent, pre-op evaluation, timeout performed, IV checked, risks and benefits discussed and monitors and equipment checked  Prep:  Pt Position:sitting  Sterile Tech:gloves, mask, sterile barrier and cap  Prep:chlorhexidine gluconate and isopropyl alcohol  Monitoring:blood pressure monitoring and continuous pulse oximetry  Epidural Block Procedure:  Approach:midline  Guidance:landmark technique and palpation technique  Location:L3-L4  Needle Type:Tuohy  Needle Gauge:17 G  Loss of Resistance Medium: air  Loss of Resistance: 4cm  Cath Depth at skin:9 cm  Paresthesia: none  Aspiration:negative  Test Dose:negative  Number of Attempts: 1  Post Assessment:  Dressing:secured with tape and occlusive dressing applied (Tegaderm Placed)  Pt Tolerance:patient tolerated the procedure well with no apparent complications  Complications:no

## 2019-07-21 LAB
BASOPHILS # BLD AUTO: 0.03 10*3/MM3 (ref 0–0.2)
BASOPHILS NFR BLD AUTO: 0.2 % (ref 0–1.5)
DEPRECATED RDW RBC AUTO: 46.7 FL (ref 37–54)
EOSINOPHIL # BLD AUTO: 0.02 10*3/MM3 (ref 0–0.4)
EOSINOPHIL NFR BLD AUTO: 0.2 % (ref 0.3–6.2)
ERYTHROCYTE [DISTWIDTH] IN BLOOD BY AUTOMATED COUNT: 13.1 % (ref 12.3–15.4)
HCT VFR BLD AUTO: 30.5 % (ref 34–46.6)
HGB BLD-MCNC: 9.8 G/DL (ref 12–15.9)
IMM GRANULOCYTES # BLD AUTO: 0.09 10*3/MM3 (ref 0–0.05)
IMM GRANULOCYTES NFR BLD AUTO: 0.7 % (ref 0–0.5)
LYMPHOCYTES # BLD AUTO: 2.24 10*3/MM3 (ref 0.7–3.1)
LYMPHOCYTES NFR BLD AUTO: 17 % (ref 19.6–45.3)
MCH RBC QN AUTO: 32.2 PG (ref 26.6–33)
MCHC RBC AUTO-ENTMCNC: 32.1 G/DL (ref 31.5–35.7)
MCV RBC AUTO: 100.3 FL (ref 79–97)
MONOCYTES # BLD AUTO: 1.04 10*3/MM3 (ref 0.1–0.9)
MONOCYTES NFR BLD AUTO: 7.9 % (ref 5–12)
NEUTROPHILS # BLD AUTO: 9.74 10*3/MM3 (ref 1.7–7)
NEUTROPHILS NFR BLD AUTO: 74 % (ref 42.7–76)
NRBC BLD AUTO-RTO: 0 /100 WBC (ref 0–0.2)
PLATELET # BLD AUTO: 180 10*3/MM3 (ref 140–450)
PMV BLD AUTO: 11.5 FL (ref 6–12)
RBC # BLD AUTO: 3.04 10*6/MM3 (ref 3.77–5.28)
WBC NRBC COR # BLD: 13.16 10*3/MM3 (ref 3.4–10.8)

## 2019-07-21 PROCEDURE — 85025 COMPLETE CBC W/AUTO DIFF WBC: CPT | Performed by: OBSTETRICS & GYNECOLOGY

## 2019-07-21 RX ADMIN — IBUPROFEN 600 MG: 600 TABLET ORAL at 22:08

## 2019-07-21 RX ADMIN — HYDROCODONE BITARTRATE AND ACETAMINOPHEN 2 TABLET: 5; 325 TABLET ORAL at 20:11

## 2019-07-21 RX ADMIN — IBUPROFEN 600 MG: 600 TABLET ORAL at 01:50

## 2019-07-21 RX ADMIN — HYDROCODONE BITARTRATE AND ACETAMINOPHEN 2 TABLET: 5; 325 TABLET ORAL at 06:39

## 2019-07-21 RX ADMIN — IBUPROFEN 600 MG: 600 TABLET ORAL at 16:18

## 2019-07-21 RX ADMIN — IBUPROFEN 600 MG: 600 TABLET ORAL at 10:34

## 2019-07-21 RX ADMIN — DOCUSATE SODIUM 100 MG: 100 CAPSULE, LIQUID FILLED ORAL at 21:01

## 2019-07-21 RX ADMIN — HYDROCODONE BITARTRATE AND ACETAMINOPHEN 2 TABLET: 5; 325 TABLET ORAL at 01:49

## 2019-07-21 RX ADMIN — DOCUSATE SODIUM 100 MG: 100 CAPSULE, LIQUID FILLED ORAL at 10:34

## 2019-07-21 RX ADMIN — HYDROCODONE BITARTRATE AND ACETAMINOPHEN 2 TABLET: 5; 325 TABLET ORAL at 10:34

## 2019-07-21 RX ADMIN — HYDROCODONE BITARTRATE AND ACETAMINOPHEN 2 TABLET: 5; 325 TABLET ORAL at 14:48

## 2019-07-22 RX ORDER — OXYCODONE AND ACETAMINOPHEN 7.5; 325 MG/1; MG/1
1 TABLET ORAL EVERY 4 HOURS PRN
Status: DISCONTINUED | OUTPATIENT
Start: 2019-07-22 | End: 2019-07-24 | Stop reason: HOSPADM

## 2019-07-22 RX ADMIN — POLYETHYLENE GLYCOL 3350 17 G: 17 POWDER, FOR SOLUTION ORAL at 13:29

## 2019-07-22 RX ADMIN — IBUPROFEN 600 MG: 600 TABLET ORAL at 11:13

## 2019-07-22 RX ADMIN — HYDROCODONE BITARTRATE AND ACETAMINOPHEN 2 TABLET: 5; 325 TABLET ORAL at 21:06

## 2019-07-22 RX ADMIN — IBUPROFEN 600 MG: 600 TABLET ORAL at 05:05

## 2019-07-22 RX ADMIN — IBUPROFEN 600 MG: 600 TABLET ORAL at 23:52

## 2019-07-22 RX ADMIN — IBUPROFEN 600 MG: 600 TABLET ORAL at 17:10

## 2019-07-22 RX ADMIN — OXYCODONE HYDROCHLORIDE AND ACETAMINOPHEN 1 TABLET: 7.5; 325 TABLET ORAL at 08:58

## 2019-07-22 RX ADMIN — HYDROCODONE BITARTRATE AND ACETAMINOPHEN 2 TABLET: 5; 325 TABLET ORAL at 00:28

## 2019-07-22 RX ADMIN — HYDROCODONE BITARTRATE AND ACETAMINOPHEN 2 TABLET: 5; 325 TABLET ORAL at 17:09

## 2019-07-22 RX ADMIN — HYDROCODONE BITARTRATE AND ACETAMINOPHEN 2 TABLET: 5; 325 TABLET ORAL at 05:05

## 2019-07-22 RX ADMIN — HYDROCODONE BITARTRATE AND ACETAMINOPHEN 2 TABLET: 5; 325 TABLET ORAL at 13:29

## 2019-07-22 RX ADMIN — DOCUSATE SODIUM 100 MG: 100 CAPSULE, LIQUID FILLED ORAL at 08:58

## 2019-07-23 RX ORDER — NALBUPHINE HYDROCHLORIDE 20 MG/ML
2.5 INJECTION, SOLUTION INTRAMUSCULAR; INTRAVENOUS; SUBCUTANEOUS EVERY 4 HOURS PRN
Status: DISCONTINUED | OUTPATIENT
Start: 2019-07-23 | End: 2019-07-23

## 2019-07-23 RX ADMIN — DOCUSATE SODIUM 100 MG: 100 CAPSULE, LIQUID FILLED ORAL at 10:01

## 2019-07-23 RX ADMIN — HYDROCODONE BITARTRATE AND ACETAMINOPHEN 1 TABLET: 7.5; 325 TABLET ORAL at 21:41

## 2019-07-23 RX ADMIN — HYDROCODONE BITARTRATE AND ACETAMINOPHEN 1 TABLET: 7.5; 325 TABLET ORAL at 10:01

## 2019-07-23 RX ADMIN — IBUPROFEN 600 MG: 600 TABLET ORAL at 06:15

## 2019-07-23 RX ADMIN — HYDROCODONE BITARTRATE AND ACETAMINOPHEN 1 TABLET: 7.5; 325 TABLET ORAL at 17:31

## 2019-07-23 RX ADMIN — IBUPROFEN 600 MG: 600 TABLET ORAL at 12:37

## 2019-07-23 RX ADMIN — IBUPROFEN 600 MG: 600 TABLET ORAL at 17:31

## 2019-07-23 RX ADMIN — POLYETHYLENE GLYCOL 3350 17 G: 17 POWDER, FOR SOLUTION ORAL at 10:01

## 2019-07-24 VITALS
TEMPERATURE: 98.1 F | HEIGHT: 64 IN | BODY MASS INDEX: 28.17 KG/M2 | WEIGHT: 165 LBS | DIASTOLIC BLOOD PRESSURE: 82 MMHG | HEART RATE: 78 BPM | OXYGEN SATURATION: 95 % | RESPIRATION RATE: 16 BRPM | SYSTOLIC BLOOD PRESSURE: 124 MMHG

## 2019-07-24 LAB
CYTO UR: NORMAL
LAB AP CASE REPORT: NORMAL
LAB AP CLINICAL INFORMATION: NORMAL
PATH REPORT.FINAL DX SPEC: NORMAL
PATH REPORT.GROSS SPEC: NORMAL

## 2019-07-24 RX ORDER — IBUPROFEN 600 MG/1
600 TABLET ORAL EVERY 6 HOURS PRN
Qty: 30 TABLET | Refills: 0 | Status: SHIPPED | OUTPATIENT
Start: 2019-07-24 | End: 2022-10-06

## 2019-07-24 RX ORDER — HYDROCODONE BITARTRATE AND ACETAMINOPHEN 5; 325 MG/1; MG/1
1-2 TABLET ORAL EVERY 4 HOURS PRN
Qty: 18 TABLET | Refills: 0 | Status: SHIPPED | OUTPATIENT
Start: 2019-07-24 | End: 2019-07-27

## 2019-07-24 RX ADMIN — POLYETHYLENE GLYCOL 3350 17 G: 17 POWDER, FOR SOLUTION ORAL at 08:22

## 2019-07-24 RX ADMIN — HYDROCODONE BITARTRATE AND ACETAMINOPHEN 1 TABLET: 7.5; 325 TABLET ORAL at 03:31

## 2019-07-24 RX ADMIN — HYDROCODONE BITARTRATE AND ACETAMINOPHEN 1 TABLET: 7.5; 325 TABLET ORAL at 09:26

## 2019-07-24 RX ADMIN — IBUPROFEN 600 MG: 600 TABLET ORAL at 03:31

## 2019-07-24 RX ADMIN — IBUPROFEN 600 MG: 600 TABLET ORAL at 09:26

## 2020-09-23 ENCOUNTER — OFFICE VISIT (OUTPATIENT)
Dept: OBSTETRICS AND GYNECOLOGY | Facility: CLINIC | Age: 25
End: 2020-09-23

## 2020-09-23 VITALS
HEIGHT: 64 IN | BODY MASS INDEX: 22.91 KG/M2 | DIASTOLIC BLOOD PRESSURE: 80 MMHG | SYSTOLIC BLOOD PRESSURE: 128 MMHG | WEIGHT: 134.2 LBS

## 2020-09-23 DIAGNOSIS — Z00.00 ANNUAL PHYSICAL EXAM: Primary | ICD-10-CM

## 2020-09-23 DIAGNOSIS — Z30.41 ENCOUNTER FOR SURVEILLANCE OF CONTRACEPTIVE PILLS: ICD-10-CM

## 2020-09-23 PROCEDURE — 99395 PREV VISIT EST AGE 18-39: CPT | Performed by: OBSTETRICS & GYNECOLOGY

## 2020-09-23 RX ORDER — NORGESTIMATE AND ETHINYL ESTRADIOL 7DAYSX3 28
1 KIT ORAL DAILY
COMMUNITY
Start: 2020-09-04 | End: 2020-09-23 | Stop reason: SDUPTHER

## 2020-09-23 RX ORDER — NORGESTIMATE AND ETHINYL ESTRADIOL 7DAYSX3 28
1 KIT ORAL DAILY
Qty: 28 TABLET | Refills: 11 | Status: SHIPPED | OUTPATIENT
Start: 2020-09-23 | End: 2022-10-06

## 2020-09-23 NOTE — PROGRESS NOTES
GYN Annual Exam     CC - Here for annual exam. No complaints voiced.    Subjective   HPI  Seema Barr is a 25 y.o. female, , who presents for annual well woman exam.  Her last LMP was Patient's last menstrual period was 2020 (exact date)..  Periods are regular every 28-30 days, lasting 4-7 days.  Dysmenorrhea:none  Patient reports problems with: none voiced.  Partner Status: Marital Status: .  New Partners since last visit: no.  Desires STD Screening: no.    Additional OB/GYN History   Current contraception:OCP  Last Pap :   Last Completed Pap Smear       Status Date      PAP SMEAR Done 2019 in [place]        History of abnormal Pap smear: no  Family history of uterine, colon, breast, or ovarian cancer: no  Performs monthly Self-Breast Exam: no  Last mammogram:   Last Completed Mammogram     Patient has no health maintenance due at this time        Feelings of Anxiety or Depression: yes some anxiety  Tobacco Usage?: No   OB History        1    Para   1    Term   1       0    AB   0    Living   1       SAB   0    TAB   0    Ectopic   0    Molar   0    Multiple   0    Live Births   1                Health Maintenance   Topic Date Due   • Annual Gynecologic Pelvic and Breast Exam  1995   • ANNUAL PHYSICAL  1998   • HPV VACCINES (1 - 2-dose series) 2006   • TDAP/TD VACCINES (2 - Td) 2017   • INFLUENZA VACCINE  2020   • PAP SMEAR  2022   • Pneumococcal Vaccine 65+ (1 of 1 - PPSV23) 2060   • HEPATITIS C SCREENING  Completed   • Pneumococcal Vaccine 0-64  Aged Out       The additional following portions of the patient's history were reviewed and updated as appropriate: allergies, current medications, past family history, past medical history, past social history, past surgical history and problem list.    Review of Systems   Constitutional: Negative.    HENT: Negative.    Eyes: Negative.    Respiratory: Negative.    Cardiovascular:  "Negative.    Gastrointestinal: Negative.    Endocrine: Negative.    Genitourinary: Negative.    Musculoskeletal: Negative.    Skin: Negative.    Allergic/Immunologic: Negative.    Neurological: Negative.    Hematological: Negative.    Psychiatric/Behavioral: Negative.        I have reviewed and agree with the HPI, ROS, and historical information as entered above. Apollo Grant MD    Objective   /80   Ht 162.6 cm (64\")   Wt 60.9 kg (134 lb 3.2 oz)   LMP 09/02/2020 (Exact Date)   BMI 23.04 kg/m²     PE  Breast: Breast without masses, axilla normal, nontender  Heart-regular rate with no murmurs rubs or gallops  Lungs-clear to auscultation, normal breath sounds bilaterally  Abd-soft nontender, no hepatosplenomegaly, no masses, no guarding or rebound  Pelvic exam-external genitalia normal  Vagina normal  Cervix without lesions  Pap done      Assessment/Plan     Assessment     Problem List Items Addressed This Visit        Other    Encounter for surveillance of contraceptive pills      Other Visit Diagnoses     Annual physical exam    -  Primary    Relevant Orders    Pap IG, Rfx HPV ASCU          1. GYN annual well woman exam.   2. Normal exam will renew birth control pills  3. Monitor guidelines we will do cytology alone every 3 years with cotesting at age 30    Plan     1. Next pap due in: 3 years  2.  Healthy lifestyles including diet and exercise reviewed    Apollo Grant MD  09/23/2020  "

## 2020-11-19 ENCOUNTER — TELEPHONE (OUTPATIENT)
Dept: OBSTETRICS AND GYNECOLOGY | Facility: CLINIC | Age: 25
End: 2020-11-19

## 2020-11-19 NOTE — TELEPHONE ENCOUNTER
Has been taking birth control since she delivered, she states the past 2-3 months, around the time of her period she will notice discharge before it starts, there has been a couple times where she has forgotten to take a pill, and will try to catch up and it will make her nauseas, would like to discuss.

## 2020-11-30 ENCOUNTER — TELEPHONE (OUTPATIENT)
Dept: OBSTETRICS AND GYNECOLOGY | Facility: CLINIC | Age: 25
End: 2020-11-30

## 2020-11-30 NOTE — TELEPHONE ENCOUNTER
Patient states she was on her cycle this past week, she went to restroom to change our tampon and she had grayish blue discharge, and this has never happened to her, she states the discharge was only on the tampon and not when she wiped, she notes she has been having a headache as well

## 2020-11-30 NOTE — TELEPHONE ENCOUNTER
She reports no AUB, burning or itching.  She had a negative Covid test 5 days from when she spoke to Phillip.  Last pap was normal.    OK to watch if discharge or symptoms develop or re occur  Call back.  All seems ok

## 2021-01-14 ENCOUNTER — TELEPHONE (OUTPATIENT)
Dept: OBSTETRICS AND GYNECOLOGY | Facility: CLINIC | Age: 26
End: 2021-01-14

## 2021-01-14 NOTE — TELEPHONE ENCOUNTER
Patient was just wondering about getting the covid shot, she said she would like to have a child in the future   
Safe preconceptually. Left message  
no

## 2021-09-29 ENCOUNTER — OFFICE VISIT (OUTPATIENT)
Dept: OBSTETRICS AND GYNECOLOGY | Facility: CLINIC | Age: 26
End: 2021-09-29

## 2021-09-29 VITALS — WEIGHT: 128.4 LBS | BODY MASS INDEX: 22.04 KG/M2 | SYSTOLIC BLOOD PRESSURE: 104 MMHG | DIASTOLIC BLOOD PRESSURE: 70 MMHG

## 2021-09-29 DIAGNOSIS — Z01.419 WOMEN'S ANNUAL ROUTINE GYNECOLOGICAL EXAMINATION: Primary | ICD-10-CM

## 2021-09-29 DIAGNOSIS — F32.A ANXIETY AND DEPRESSION: ICD-10-CM

## 2021-09-29 DIAGNOSIS — F41.9 ANXIETY AND DEPRESSION: ICD-10-CM

## 2021-09-29 DIAGNOSIS — N94.10 FEMALE DYSPAREUNIA: ICD-10-CM

## 2021-09-29 DIAGNOSIS — N94.6 DYSMENORRHEA: ICD-10-CM

## 2021-09-29 PROCEDURE — 99395 PREV VISIT EST AGE 18-39: CPT | Performed by: OBSTETRICS & GYNECOLOGY

## 2021-09-29 RX ORDER — NAPROXEN SODIUM 550 MG/1
550 TABLET ORAL 2 TIMES DAILY PRN
Qty: 40 TABLET | Refills: 4 | Status: SHIPPED | OUTPATIENT
Start: 2021-09-29 | End: 2022-09-29

## 2021-09-29 RX ORDER — VENLAFAXINE HYDROCHLORIDE 37.5 MG/1
37.5 CAPSULE, EXTENDED RELEASE ORAL DAILY
Qty: 30 CAPSULE | Refills: 11 | Status: SHIPPED | OUTPATIENT
Start: 2021-09-29 | End: 2022-10-06

## 2021-09-29 NOTE — PROGRESS NOTES
GYN Annual Exam     CC - Here for annual exam.  She also reports sensitivity around  scar.     Subjective   HPI  Seema Barr is a 26 y.o. female, , who presents for annual well woman exam.  Her last LMP was Patient's last menstrual period was 2021 (approximate)..  Periods are regular every 28-30 days, lasting 4-7 days.  Dysmenorrhea:moderate to severe, premenstrually and through out her menses.  Patient reports problems with: none.  Partner Status: Marital Status: .  New Partners since last visit: YES/NO/Other: no.     Additional OB/GYN History   Current contraception: None  Last Pap : 2020- normal  Last Completed Pap Smear          Ordered - PAP SMEAR (Every 3 Years) Ordered on 2020  SCANNED - PAP SMEAR    2019  Done - in [place]              History of abnormal Pap smear: no  Family history of uterine, colon, breast, or ovarian cancer: no  Performs monthly Self-Breast Exam: no  Last mammogram: Never  Last Completed Mammogram     This patient has no relevant Health Maintenance data.        Feelings of Anxiety or Depression: yes- anxiety   Tobacco Usage?: No   OB History        1    Para   1    Term   1       0    AB   0    Living   1       SAB   0    TAB   0    Ectopic   0    Molar   0    Multiple   0    Live Births   1                Health Maintenance   Topic Date Due   • HPV VACCINES (1 - 2-dose series) Never done   • TDAP/TD VACCINES (2 - Td or Tdap) 2017   • ANNUAL PHYSICAL  2021   • Annual Gynecologic Pelvic and Breast Exam  2021   • INFLUENZA VACCINE  10/01/2021   • PAP SMEAR  2023   • HEPATITIS C SCREENING  Completed   • COVID-19 Vaccine  Completed   • Pneumococcal Vaccine 0-64  Aged Out         Current Outpatient Medications:   •  ibuprofen (ADVIL,MOTRIN) 600 MG tablet, Take 1 tablet by mouth Every 6 (Six) Hours As Needed for Mild Pain ., Disp: 30 tablet, Rfl: 0  •  naproxen sodium (Anaprox DS) 550 MG  tablet, Take 1 tablet by mouth 2 (Two) Times a Day As Needed for Mild Pain  (Menstrual cramps)., Disp: 40 tablet, Rfl: 4  •  Prenatal Vit-Fe Fumarate-FA (PRENATAL 27-) 27-1 MG tablet tablet, Take 1 tablet by mouth Daily., Disp: , Rfl:   •  Tri-Linyah 0.18/0.215/0.25 MG-35 MCG per tablet, Take 1 tablet by mouth Daily for 28 days., Disp: 28 tablet, Rfl: 11  •  venlafaxine XR (Effexor XR) 37.5 MG 24 hr capsule, Take 1 capsule by mouth Daily., Disp: 30 capsule, Rfl: 11    The additional following portions of the patient's history were reviewed and updated as appropriate: allergies, current medications, past family history, past medical history, past social history, past surgical history and problem list.    Review of Systems   Constitutional: Negative.    HENT: Negative.    Eyes: Negative.    Respiratory: Negative.    Cardiovascular: Negative.    Gastrointestinal: Negative.    Endocrine: Negative.    Genitourinary: Positive for menstrual problem (severe dysmenorrhea).   Musculoskeletal: Negative.    Skin: Negative.         Sensitivity around  incision site.    Allergic/Immunologic: Negative.    Neurological: Negative.    Hematological: Negative.    Psychiatric/Behavioral: The patient is nervous/anxious (anxiety).        I have reviewed and agree with the HPI, ROS, and historical information as entered above. Apollo Grant MD    Objective   /70   Wt 58.2 kg (128 lb 6.4 oz)   LMP 2021 (Approximate)   Breastfeeding No   BMI 22.04 kg/m²     PE    Breast: Without masses ,nontender, no skin changes or retractions  Axilla: Normal, no lymphadenopathy  Heart: Regular rate no murmurs rubs or gallops  Lungs: Clear to auscultation, normal breath sounds bilaterally  Abdomen: Soft nontender, no hepatosplenomegaly, no guarding or rebound, no masses  Pelvic exam  External genitalia: Normal introitus and vulva  Vagina: Normal mucosa no bleeding inflammation or discharge  Bladder: Normal position  nontender  Urethral meatus and urethra: Normal nontender  Cervix: No lesions, no discharge, bleeding or inflammation  Bimanual: Nontender adnexa clear, no sign of uterine or ovarian enlargement       Assessment/Plan     Assessment     Problem List Items Addressed This Visit        Genitourinary and Reproductive     Women's annual routine gynecological examination - Primary    Relevant Orders    Pap IG, Ct-Ng, Rfx HPV ASCU    Dysmenorrhea    Overview     Mild pain with periods         Female dyspareunia    Overview     Occasional pain with intercourse 25% of the time            Mental Health    Anxiety and depression    Relevant Medications    venlafaxine XR (Effexor XR) 37.5 MG 24 hr capsule          1. GYN annual well woman exam.   2. Anaprox for cramps, Effexor for anxiety    Plan     1. Next pap due in: 1 to 3 years  2. Reviewed monthly self breast exams.  Instructed to call with lumps, pain, or breast discharge.  Yearly mammograms ordered.  Preventative health initiatives reviewed    Apollo Grant MD  09/29/2021

## 2021-10-12 DIAGNOSIS — Z01.419 WOMEN'S ANNUAL ROUTINE GYNECOLOGICAL EXAMINATION: ICD-10-CM

## 2021-12-28 ENCOUNTER — OFFICE VISIT (OUTPATIENT)
Dept: OBSTETRICS AND GYNECOLOGY | Facility: CLINIC | Age: 26
End: 2021-12-28

## 2021-12-28 VITALS
DIASTOLIC BLOOD PRESSURE: 70 MMHG | SYSTOLIC BLOOD PRESSURE: 105 MMHG | HEIGHT: 64 IN | WEIGHT: 127.4 LBS | BODY MASS INDEX: 21.75 KG/M2

## 2021-12-28 DIAGNOSIS — Z12.4 ENCOUNTER FOR REPEAT PAPANICOLAOU SMEAR OF CERVIX: Primary | ICD-10-CM

## 2021-12-28 DIAGNOSIS — R87.610 PAP SMEAR ABNORMALITY OF CERVIX WITH ASCUS FAVORING BENIGN: ICD-10-CM

## 2021-12-28 DIAGNOSIS — R53.83 OTHER FATIGUE: ICD-10-CM

## 2021-12-28 DIAGNOSIS — R10.2 PELVIC PAIN: ICD-10-CM

## 2021-12-28 LAB
ERYTHROCYTE [DISTWIDTH] IN BLOOD BY AUTOMATED COUNT: 12.6 % (ref 12.3–15.4)
HCT VFR BLD AUTO: 35.7 % (ref 34–46.6)
HGB BLD-MCNC: 11.5 G/DL (ref 12–15.9)
MCH RBC QN AUTO: 28.1 PG (ref 26.6–33)
MCHC RBC AUTO-ENTMCNC: 32.2 G/DL (ref 31.5–35.7)
MCV RBC AUTO: 87.3 FL (ref 79–97)
PLATELET # BLD AUTO: 318 10*3/MM3 (ref 140–450)
RBC # BLD AUTO: 4.09 10*6/MM3 (ref 3.77–5.28)
T4 FREE SERPL-MCNC: 1.07 NG/DL (ref 0.93–1.7)
TSH SERPL DL<=0.005 MIU/L-ACNC: 1.98 UIU/ML (ref 0.27–4.2)
WBC # BLD AUTO: 4.52 10*3/MM3 (ref 3.4–10.8)

## 2021-12-28 PROCEDURE — 99213 OFFICE O/P EST LOW 20 MIN: CPT | Performed by: OBSTETRICS & GYNECOLOGY

## 2021-12-28 NOTE — PROGRESS NOTES
Gynecologic Exam Note      Chief Complaint   Patient presents with   • Follow-up     repeat pap       Subjective   HPI  Seema Barr is a 26 y.o. female, , who presents for a repeat pap smear. Last pap done on 21 ASCUS, HPV negative, STD negative. Patient would like to repeat now instead of waiting one year because she is concerned about the abnormal results.    She states she has been experiencing some generalized intermittent mid-pelvic pain, heavy menstruals for the first few days of her period, she has been feeling weak at times throughout her cycle, reports pain with intercourse and frequent white discharge that at times causes itchiness. She reports she has experienced these problems for atleast 6 months.  She describes the severity as intermittent.  She states that the problem is staying the same and not improving.  The patient reports additional symptoms as none.  She is open to lab work if needed.    Her last LMP was Patient's last menstrual period was 2021 (exact date)..  Periods are regular every 28-30 days, lasting 5 days.  Dysmenorrhea:moderate, occurring premenstrually and first 1-2 days of flow.  Patient reports problems with: none.  Partner Status: Marital Status: .  New Partners since last visit: no.  Desires STD Screening: no.    Additional OB/GYN History   Current contraception: contraceptive methods: None  Desires to: continue contraception  Last Pap :   Last Completed Pap Smear          Ordered - PAP SMEAR (Every 3 Years) Ordered on 2021  SCANNED - PAP SMEAR    2020  SCANNED - PAP SMEAR    2019  Done - in [place]              History of abnormal Pap smear: yes - ASCUS, HPV negative, STD neg-21  Last mammogram:   Last Completed Mammogram     This patient has no relevant Health Maintenance data.        Tobacco Usage?: No   OB History        1    Para   1    Term   1       0    AB   0    Living   1       SAB   0  "   IAB   0    Ectopic   0    Molar   0    Multiple   0    Live Births   1                Health Maintenance   Topic Date Due   • COVID-19 Vaccine (1) Never done   • HPV VACCINES (1 - 2-dose series) Never done   • TDAP/TD VACCINES (2 - Td or Tdap) 01/09/2017   • INFLUENZA VACCINE  Never done   • ANNUAL PHYSICAL  09/24/2021   • Annual Gynecologic Pelvic and Breast Exam  09/30/2022   • PAP SMEAR  09/29/2024   • HEPATITIS C SCREENING  Completed   • Pneumococcal Vaccine 0-64  Aged Out       The additional following portions of the patient's history were reviewed and updated as appropriate: allergies, current medications, past family history, past medical history, past social history, past surgical history and problem list.    Review of Systems   Genitourinary: Positive for dyspareunia, menstrual problem (heavy bleeding), pelvic pain and vaginal discharge.       I have reviewed and agree with the HPI, ROS, and historical information as entered above. Apollo Grant MD    Objective   /70   Ht 162.6 cm (64\")   Wt 57.8 kg (127 lb 6.4 oz)   LMP 12/05/2021 (Exact Date)   BMI 21.87 kg/m²     Physical Exam   External genitalia normal  Vagina normal  Cervix without lesions Pap done    Assessment/Plan   [place] in 5 to 6 weeks  Assessment     Problem List Items Addressed This Visit        Gastrointestinal Abdominal     Pelvic pain    Overview     Intermittent            Genitourinary and Reproductive     Encounter for repeat Papanicolaou smear of cervix - Primary    Relevant Orders    IGP, Rfx Aptima HPV ASCU    Pap smear abnormality of cervix with ASCUS favoring benign    Overview     HPV negative once a repeat early            Symptoms and Signs    Other fatigue    Relevant Orders    T4, Free    TSH    CBC (No Diff)      History of ascus HPV negative, fatigue and occasional pelvic pain      Plan     Return in about 6 weeks (around 2/8/2022), or Transvaginal ultrasound 6 weeks, for Recheck.  1. Labs today, Pap " repeated      Apollo Grant MD  12/28/2021

## 2022-01-11 DIAGNOSIS — Z12.4 ENCOUNTER FOR REPEAT PAPANICOLAOU SMEAR OF CERVIX: ICD-10-CM

## 2022-10-06 ENCOUNTER — OFFICE VISIT (OUTPATIENT)
Dept: OBSTETRICS AND GYNECOLOGY | Facility: CLINIC | Age: 27
End: 2022-10-06

## 2022-10-06 VITALS
SYSTOLIC BLOOD PRESSURE: 112 MMHG | WEIGHT: 129 LBS | DIASTOLIC BLOOD PRESSURE: 82 MMHG | BODY MASS INDEX: 22.02 KG/M2 | HEIGHT: 64 IN

## 2022-10-06 DIAGNOSIS — Z01.419 WOMEN'S ANNUAL ROUTINE GYNECOLOGICAL EXAMINATION: Primary | ICD-10-CM

## 2022-10-06 DIAGNOSIS — Z87.42 HISTORY OF ABNORMAL CERVICAL PAP SMEAR: ICD-10-CM

## 2022-10-06 PROCEDURE — 99395 PREV VISIT EST AGE 18-39: CPT | Performed by: OBSTETRICS & GYNECOLOGY

## 2022-10-06 RX ORDER — VENLAFAXINE HYDROCHLORIDE 37.5 MG/1
1 CAPSULE, EXTENDED RELEASE ORAL DAILY
COMMUNITY
Start: 2022-09-29

## 2022-10-06 NOTE — PROGRESS NOTES
Gynecologic Annual Exam Note        Gynecologic Exam        Subjective     HPI  Seema Barr is a 27 y.o.  female who presents for annual well woman exam as a established patient of practice who is new to me. There were no changes to her medical or surgical history since her last visit.. Patient reports problems with: none. Patient's last menstrual period was 09/15/2022. Her periods are regular every 25-35 days, lasting 5 days. The flow is heavy for the first 1-2 days of flow then lightens. Patient reports using 8-10 pads/tampons per day. Dysmenorrhea:mild, occurring first 1-2 days of flow. Partner Status: Marital Status: .  She is sexually active. She has not had new partners. STD testing recommendations have been explained to the patient and she does not desire STD testing.    Additional OB/GYN History   Current contraception: contraceptive methods: Condoms  Desires to: do not start contraception  Thromboembolic Disease: none  Age of menarche: 13    History of STD: no    Last Pap : 2021. Results: negative. HPV: not done  Last Completed Pap Smear          Ordered - PAP SMEAR (Every 3 Years) Ordered on 10/6/2022    2021  SCANNED - PAP SMEAR    2021  SCANNED - PAP SMEAR    2020  SCANNED - PAP SMEAR    2019  Done - in [place]                 History of abnormal Pap smear: yes - 2021- ASCUS, HPV negative- repeat pap normal  Gardasil status:uncertain if she received the vaccine  Family history of uterine, colon, breast, or ovarian cancer: no  Performs monthly Self-Breast Exam: no  Exercises Regularly:no  Feelings of Anxiety or Depression: yes - anxiety- patient states that she recently start Effexor and feels like this is helping.  Tobacco Usage?: No       Current Outpatient Medications:   •  venlafaxine XR (EFFEXOR-XR) 37.5 MG 24 hr capsule, Take 1 capsule by mouth Daily., Disp: , Rfl:      Patient denies the need for medication refills today.    OB History   "      1    Para   1    Term   1       0    AB   0    Living   1       SAB   0    IAB   0    Ectopic   0    Molar   0    Multiple   0    Live Births   1                Health Maintenance   Topic Date Due   • TDAP/TD VACCINES (2 - Td or Tdap) 2017   • ANNUAL PHYSICAL  2021   • COVID-19 Vaccine (4 - Booster for Moderna series) 2022   • INFLUENZA VACCINE  Never done   • Annual Gynecologic Pelvic and Breast Exam  2022   • PAP SMEAR  2024   • HEPATITIS C SCREENING  Completed   • Pneumococcal Vaccine 0-64  Aged Out       Past Medical History:   Diagnosis Date   • Anemia    • Anxiety    • Depression    • Dysmenorrhea    • Menorrhagia         Past Surgical History:   Procedure Laterality Date   •  SECTION N/A 2019    Procedure:  SECTION PRIMARY;  Surgeon: Apollo Grant MD;  Location: [facility] LABOR DELIVERY;  Service: Obstetrics/Gynecology       The additional following portions of the patient's history were reviewed and updated as appropriate: allergies, current medications, past family history, past medical history, past social history, past surgical history and problem list.    Review of Systems   Constitutional: Negative.    HENT: Negative.    Eyes: Negative.    Respiratory: Negative.    Cardiovascular: Negative.    Gastrointestinal: Negative.    Endocrine: Negative.    Genitourinary: Negative.    Musculoskeletal: Negative.    Skin: Negative.    Allergic/Immunologic: Negative.    Neurological: Negative.    Hematological: Negative.    Psychiatric/Behavioral: Negative.          I have reviewed and agree with the HPI, ROS, and historical information as entered above. Parisa Law MD        Objective   /82   Ht 162.6 cm (64\")   Wt 58.5 kg (129 lb)   LMP 09/15/2022   BMI 22.14 kg/m²     Physical Exam  Vitals and nursing note reviewed. Exam conducted with a chaperone present.   Constitutional:       Appearance: She is well-developed.   HENT:      " Head: Normocephalic and atraumatic.   Neck:      Thyroid: No thyroid mass or thyromegaly.   Cardiovascular:      Rate and Rhythm: Normal rate and regular rhythm.      Heart sounds: No murmur heard.  Pulmonary:      Effort: Pulmonary effort is normal. No retractions.      Breath sounds: Normal breath sounds. No wheezing, rhonchi or rales.   Chest:      Chest wall: No mass or tenderness.   Breasts:      Right: Normal. No mass, nipple discharge, skin change or tenderness.      Left: Normal. No mass, nipple discharge, skin change or tenderness.       Abdominal:      General: Bowel sounds are normal.      Palpations: Abdomen is soft. Abdomen is not rigid. There is no mass.      Tenderness: There is no abdominal tenderness. There is no guarding.      Hernia: No hernia is present. There is no hernia in the left inguinal area.   Genitourinary:     Labia:         Right: No rash, tenderness or lesion.         Left: No rash, tenderness or lesion.       Vagina: Normal. No vaginal discharge or lesions.      Cervix: No cervical motion tenderness, discharge, lesion or cervical bleeding.      Uterus: Normal. Not enlarged, not fixed and not tender.       Adnexa:         Right: No mass or tenderness.          Left: No mass or tenderness.        Rectum: No external hemorrhoid.            Comments: Bleeding with brush and agno3 applied  Musculoskeletal:      Cervical back: Normal range of motion. No muscular tenderness.   Neurological:      Mental Status: She is alert and oriented to person, place, and time.   Psychiatric:         Behavior: Behavior normal.            Assessment and Plan    Problem List Items Addressed This Visit        Genitourinary and Reproductive     Women's annual routine gynecological examination - Primary    Relevant Orders    LIQUID-BASED PAP SMEAR, [facility] ([facility])      Other Visit Diagnoses     History of abnormal cervical Pap smear        Relevant Orders    LIQUID-BASED PAP SMEAR, [facility]  ([facility])          1. GYN annual well woman exam.   2. Reviewed pap guidelines.   3. Reviewed monthly self breast exams.  Instructed to call with lumps, pain, or breast discharge.    4. RTC in 1 year or PRN with problems  5. Other: poss preg soon   Return in about 1 year (around 10/6/2023).      Parisa Law MD  10/06/2022

## 2022-10-11 LAB — REF LAB TEST METHOD: NORMAL

## 2023-11-13 ENCOUNTER — TELEPHONE (OUTPATIENT)
Dept: OBSTETRICS AND GYNECOLOGY | Facility: CLINIC | Age: 28
End: 2023-11-13
Payer: COMMERCIAL

## 2023-11-13 NOTE — TELEPHONE ENCOUNTER
Caller: Seema Barr    Relationship: Self    Best call back number: [phone]     What is the best time to reach you: OK TO CALLBACK ONLY AT AVAILABLE TILL 12:20, OR 3:30 OR LATER, OK TO LEAVE A VM     Who are you requesting to speak with (clinical staff, provider,  specific staff member): NURSE       What was the call regarding: PT STATES SHE WOULD LIKE A CALLBACK FROM A NURSE, STATES SHE CURRENTLY TRYING TO CONCEIVE THEY'VE HAD ONLY ONE CHILD, SHE WOULD LIKE TO SPEAK TO SOMEONE, ALL THE INFO PROVIDED TO HUB      Is it okay if the provider responds through MyChart: YES

## 2023-11-13 NOTE — TELEPHONE ENCOUNTER
Returned patient's call. Last seen here 10/6/22. States she had to cancel appointments here 10/10/23, 10/31/23, and 11/7/23. Has been trying to conceive for past 4 months. Is concerned because she conceived quickly with her first child. States she has been using OPKs and timed intercourse. Suggested she schedule a visit for her annual exam and can discuss further with Dr. Law at that visit. Donald v/u and agreed. She will call back to schedule when she has her schedule available.

## 2024-02-05 ENCOUNTER — TELEPHONE (OUTPATIENT)
Dept: OBSTETRICS AND GYNECOLOGY | Facility: CLINIC | Age: 29
End: 2024-02-05
Payer: COMMERCIAL

## 2024-02-06 ENCOUNTER — INITIAL PRENATAL (OUTPATIENT)
Dept: OBSTETRICS AND GYNECOLOGY | Facility: CLINIC | Age: 29
End: 2024-02-06
Payer: COMMERCIAL

## 2024-02-06 VITALS — WEIGHT: 130.2 LBS | DIASTOLIC BLOOD PRESSURE: 60 MMHG | SYSTOLIC BLOOD PRESSURE: 102 MMHG | BODY MASS INDEX: 22.35 KG/M2

## 2024-02-06 DIAGNOSIS — Z98.891 HISTORY OF C-SECTION: ICD-10-CM

## 2024-02-06 DIAGNOSIS — O26.899 RH NEGATIVE, ANTEPARTUM: ICD-10-CM

## 2024-02-06 DIAGNOSIS — Z67.91 RH NEGATIVE, ANTEPARTUM: ICD-10-CM

## 2024-02-06 DIAGNOSIS — Z34.81 MULTIGRAVIDA IN FIRST TRIMESTER: Primary | ICD-10-CM

## 2024-02-06 LAB
GLUCOSE UR STRIP-MCNC: NEGATIVE MG/DL
PROT UR STRIP-MCNC: NEGATIVE MG/DL

## 2024-02-06 RX ORDER — PRENATAL VIT NO.126/IRON/FOLIC 28MG-0.8MG
TABLET ORAL DAILY
COMMUNITY

## 2024-02-06 NOTE — PROGRESS NOTES
Initial ob visit     CC- Here for care of pregnancy        Seema Barr is a 28 y.o. female, , who presents for her first obstetrical visit.  Patient's last menstrual period was 2023.. Her CELESTINO is 2024, by Last Menstrual Period. Current GA is 8w4d.     Initial positive test date : 24, UPT        Her periods are every 21-28 days.  Prior obstetric issues: pregnancy was great, vaginal delivery started out ok until umbilical cord wrapped around baby's leg, then had to have an emergency .  Patient's past medical history is significant for:  no change .  Family history of genetic issues (includes FOB): none  Prior infections concerning in pregnancy (Rash, fever in last 2 weeks): No  Varicella Hx - vaccinated  Prior testing for Cystic Fibrosis Carrier or Sickle Cell Trait- no  Prepregnancy BMI - Body mass index is 22.35 kg/m².  History of STD: no  Hx of HSV for patient or partner: no  Ultrasound Today: yes    OB History    Para Term  AB Living   2 1 1 0 0 1   SAB IAB Ectopic Molar Multiple Live Births   0 0 0 0 0 1      # Outcome Date GA Lbr Anderson/2nd Weight Sex Delivery Anes PTL Lv   2 Current            1 Term 19 40w1d  3570 g (7 lb 13.9 oz) F CS-LTranv EPI  KADI      Complications: Fetal Intolerance       Additional Pertinent History   Last Pap : 10/06/22 Result: negative HPV:  not done     Last Completed Pap Smear            PAP SMEAR (Every 3 Years) Next due on 10/6/2025      10/06/2022  LIQUID-BASED PAP SMEAR, [facility] ([facility])    2021  SCANNED - PAP SMEAR    2021  SCANNED - PAP SMEAR    2020  SCANNED - PAP SMEAR    2019  Done - in [place]    Only the first 5 history entries have been loaded, but more history exists.                  History of abnormal Pap smear: no  Family history of uterine, colon, breast, or ovarian cancer: no  Feelings of Anxiety or Depression: no  Tobacco Usage?: No   Alcohol/Drug Use?: NO  Over the age of 35  at delivery: no  Genetic Screening:  unsure  Flu Status: Already given in current flu season    PMH    Current Outpatient Medications:     prenatal vitamin (prenatal, CLASSIC, vitamin) tablet, Take  by mouth Daily., Disp: , Rfl:     venlafaxine XR (EFFEXOR-XR) 37.5 MG 24 hr capsule, Take 1 capsule by mouth Daily. (Patient not taking: Reported on 2024), Disp: , Rfl:      Past Medical History:   Diagnosis Date    Anemia     Anxiety     Depression     Dysmenorrhea     Menorrhagia         Past Surgical History:   Procedure Laterality Date     SECTION N/A 2019    Procedure:  SECTION PRIMARY;  Surgeon: Apollo Grant MD;  Location: [facility] LABOR DELIVERY;  Service: Obstetrics/Gynecology       Review of Systems   Review of Systems    Patient Reports:  nothing unusual  Patient Denies:excessive nausea , excessive vomiting, and vaginal bleeding  All systems reviewed and otherwise normal.    I have reviewed and agree with the HPI, ROS, and historical information as entered above. Abbie Tysonf, APRN      /60   Wt 59.1 kg (130 lb 3.2 oz)   LMP 2023   BMI 22.35 kg/m²     The additional following portions of the patient's history were reviewed and updated as appropriate: allergies, current medications, past family history, past medical history, past social history, past surgical history, and problem list.    Physical Exam  General:  well developed; well nourished  no acute distress   Chest/Respiratory: No labored breathing, normal respiratory effort, normal appearance, no respiratory noises noted   Heart:  not examined   Thyroid: not examined   Breasts:  Not performed.   Abdomen: soft, non-tender; no masses  no umbilical or inguinal hernias are present  no hepato-splenomegaly   Pelvis: Not performed.        Assessment and Plan    Problem List Items Addressed This Visit          Gravid and     Rh negative, antepartum    History of     Overview     Considering            Other Visit Diagnoses       Multigravida in first trimester    -  Primary    Relevant Orders    POC Urinalysis Dipstick (Completed)    Obstetric Panel    HIV-1 / O / 2 Ag / Antibody    Urine Culture - Urine, Urine, Clean Catch    Urinalysis With Microscopic - Urine, Clean Catch    Chlamydia trachomatis, Neisseria gonorrhoeae, PCR - Urine, Urine, Clean Catch    Urine Drug Screen - Urine, Clean Catch            Pregnancy at 8w4d  Reviewed routine prenatal care with the office and educational materials given  Lab(s) Ordered  Discussed options for genetic testing including first trimester nuchal translucency screen, genetic disease carrier testing, quadruple screen, and NIPT  Discontinue the use of all non-medicinal drugs and chemicals  Patient is on Prenatal vitamins  Activity recommendation : 150 minutes/week of moderate intensity aerobic activity unless we limit for bleeding, hypertension or other pregnancy complication   Return in about 4 weeks (around 3/5/2024).  Unsure of M21.  She may have done in 4 wks or may RTO after 9 wks.      Abbie Miller, APRN  02/06/2024

## 2024-02-07 LAB
AMPHETAMINES UR QL SCN: NEGATIVE NG/ML
APPEARANCE UR: CLEAR
BACTERIA #/AREA URNS HPF: NORMAL /[HPF]
BARBITURATES UR QL SCN: NEGATIVE NG/ML
BENZODIAZ UR QL SCN: NEGATIVE NG/ML
BILIRUB UR QL STRIP: NEGATIVE
BZE UR QL SCN: NEGATIVE NG/ML
CANNABINOIDS UR QL SCN: NEGATIVE NG/ML
CASTS URNS QL MICRO: NORMAL /LPF
COLOR UR: YELLOW
CREAT UR-MCNC: 27.9 MG/DL (ref 20–300)
EPI CELLS #/AREA URNS HPF: NORMAL /HPF (ref 0–10)
GLUCOSE UR QL STRIP: NEGATIVE
HGB UR QL STRIP: NEGATIVE
KETONES UR QL STRIP: NEGATIVE
LABORATORY COMMENT REPORT: NORMAL
LEUKOCYTE ESTERASE UR QL STRIP: NEGATIVE
METHADONE UR QL SCN: NEGATIVE NG/ML
MICRO URNS: NORMAL
MICRO URNS: NORMAL
NITRITE UR QL STRIP: NEGATIVE
OPIATES UR QL SCN: NEGATIVE NG/ML
OXYCODONE+OXYMORPHONE UR QL SCN: NEGATIVE NG/ML
PCP UR QL: NEGATIVE NG/ML
PH UR STRIP: 6 [PH] (ref 5–7.5)
PH UR: 5.7 [PH] (ref 4.5–8.9)
PROPOXYPH UR QL SCN: NEGATIVE NG/ML
PROT UR QL STRIP: NEGATIVE
RBC #/AREA URNS HPF: NORMAL /HPF (ref 0–2)
SP GR UR STRIP: 1.01 (ref 1–1.03)
UROBILINOGEN UR STRIP-MCNC: 0.2 MG/DL (ref 0.2–1)
WBC #/AREA URNS HPF: NORMAL /HPF (ref 0–5)

## 2024-02-08 ENCOUNTER — TELEPHONE (OUTPATIENT)
Dept: OBSTETRICS AND GYNECOLOGY | Facility: CLINIC | Age: 29
End: 2024-02-08
Payer: COMMERCIAL

## 2024-02-08 LAB
ABO GROUP BLD: ABNORMAL
BACTERIA UR CULT: NORMAL
BACTERIA UR CULT: NORMAL
BASOPHILS # BLD AUTO: 0 X10E3/UL (ref 0–0.2)
BASOPHILS NFR BLD AUTO: 1 %
BLD GP AB SCN SERPL QL: NEGATIVE
C TRACH RRNA SPEC QL NAA+PROBE: NEGATIVE
EOSINOPHIL # BLD AUTO: 0 X10E3/UL (ref 0–0.4)
EOSINOPHIL NFR BLD AUTO: 0 %
ERYTHROCYTE [DISTWIDTH] IN BLOOD BY AUTOMATED COUNT: 20.5 % (ref 11.7–15.4)
HBV SURFACE AG SERPL QL IA: NEGATIVE
HCT VFR BLD AUTO: 25.7 % (ref 34–46.6)
HCV IGG SERPL QL IA: NON REACTIVE
HGB BLD-MCNC: 6.7 G/DL (ref 11.1–15.9)
HIV 1+2 AB+HIV1 P24 AG SERPL QL IA: NON REACTIVE
IMM GRANULOCYTES # BLD AUTO: 0 X10E3/UL (ref 0–0.1)
IMM GRANULOCYTES NFR BLD AUTO: 0 %
LYMPHOCYTES # BLD AUTO: 2.3 X10E3/UL (ref 0.7–3.1)
LYMPHOCYTES NFR BLD AUTO: 36 %
MCH RBC QN AUTO: 17.3 PG (ref 26.6–33)
MCHC RBC AUTO-ENTMCNC: 26.1 G/DL (ref 31.5–35.7)
MCV RBC AUTO: 66 FL (ref 79–97)
MONOCYTES # BLD AUTO: 0.6 X10E3/UL (ref 0.1–0.9)
MONOCYTES NFR BLD AUTO: 10 %
N GONORRHOEA RRNA SPEC QL NAA+PROBE: NEGATIVE
NEUTROPHILS # BLD AUTO: 3.3 X10E3/UL (ref 1.4–7)
NEUTROPHILS NFR BLD AUTO: 53 %
PLATELET # BLD AUTO: 541 X10E3/UL (ref 150–450)
RBC # BLD AUTO: 3.87 X10E6/UL (ref 3.77–5.28)
RH BLD: NEGATIVE
RPR SER QL: NON REACTIVE
RUBV IGG SERPL IA-ACNC: 3.19 INDEX
WBC # BLD AUTO: 6.3 X10E3/UL (ref 3.4–10.8)

## 2024-02-08 NOTE — TELEPHONE ENCOUNTER
ABBY pt. She reports hx of anemia and adkins shave occasionally dizzy episodes. Iron rich foods and supplements explained to pt. She denies bleeding or hx of blood transfusion.  Informed pt provider was notified of results and awaiting response. Pt MARTHA.

## 2024-02-08 NOTE — TELEPHONE ENCOUNTER
OP pt   8w6d  MBT: O -    NOB labs resulted hemoglobin at 6.7 and hematocrit 25.7, provider notified.     LMCB

## 2024-02-09 NOTE — TELEPHONE ENCOUNTER
Spoke with Filippo. Patient to start iron supplements. Increase iron in diet, if symptomatic prior to next appointment patient should call. Will recheck at next appointment to see if iron infusions are needed. Patient notified, v/u.

## 2024-02-13 ENCOUNTER — TELEPHONE (OUTPATIENT)
Dept: OBSTETRICS AND GYNECOLOGY | Facility: CLINIC | Age: 29
End: 2024-02-13
Payer: COMMERCIAL

## 2024-02-13 DIAGNOSIS — Z34.91 FIRST TRIMESTER PREGNANCY: Primary | ICD-10-CM

## 2024-02-13 DIAGNOSIS — D50.8 OTHER IRON DEFICIENCY ANEMIA: ICD-10-CM

## 2024-02-13 RX ORDER — FERROUS SULFATE 325(65) MG
325 TABLET ORAL 2 TIMES DAILY
Qty: 90 TABLET | Refills: 3 | Status: SHIPPED | OUTPATIENT
Start: 2024-02-13

## 2024-03-07 ENCOUNTER — ROUTINE PRENATAL (OUTPATIENT)
Dept: OBSTETRICS AND GYNECOLOGY | Facility: CLINIC | Age: 29
End: 2024-03-07
Payer: COMMERCIAL

## 2024-03-07 VITALS — DIASTOLIC BLOOD PRESSURE: 70 MMHG | WEIGHT: 135 LBS | SYSTOLIC BLOOD PRESSURE: 116 MMHG | BODY MASS INDEX: 23.17 KG/M2

## 2024-03-07 DIAGNOSIS — Z34.92 PRENATAL CARE IN SECOND TRIMESTER: Primary | ICD-10-CM

## 2024-03-07 LAB
GLUCOSE UR STRIP-MCNC: NEGATIVE MG/DL
PROT UR STRIP-MCNC: NEGATIVE MG/DL

## 2024-03-07 NOTE — PROGRESS NOTES
OB FOLLOW UP  CC- Here for care of pregnancy        Seema Barr is a 28 y.o.  12w6d patient being seen today for her obstetrical follow up visit. Patient reports occasional lower back pain and abdominal cramping, occasional nausea. She reports she has some itching at night time and red spots on her abdomen.      Would like repeat labs today for anemia.     Her prenatal care is complicated by (and status) :   Patient Active Problem List   Diagnosis    22 weeks gestation of pregnancy    Echogenic focus of heart of fetus affecting antepartum care of mother    Pregnancy    Encounter for surveillance of contraceptive pills    Women's annual routine gynecological examination    Dysmenorrhea    Female dyspareunia    Anxiety and depression    Encounter for repeat Papanicolaou smear of cervix    Pap smear abnormality of cervix with ASCUS favoring benign    Other fatigue    Pelvic pain    Rh negative, antepartum    History of        Genetic testing?: declines.  NOB labs reviewed. Anemia- taking iron  Flu Status: Already given in current flu season  Ultrasound Today: No    ROS -   Patient Denies: leaking of fluid, vaginal bleeding, dysuria, excessive vomiting, and more than 6 contractions per hour  All other systems reviewed and are negative.     The additional following portions of the patient's history were reviewed and updated as appropriate: allergies and current medications.    I have reviewed and agree with the HPI, ROS, and historical information as entered above. Parisa Law MD          /70   Wt 61.2 kg (135 lb)   LMP 2023   BMI 23.17 kg/m²         EXAM:     Prenatal Vitals  BP: 116/70  Weight: 61.2 kg (135 lb)   Fetal Heart Rate: 160          Urine Glucose Read-only: Negative  Urine Protein Read-only: Negative       Assessment and Plan    Problem List Items Addressed This Visit    None  Visit Diagnoses       Prenatal care in second trimester    -  Primary    Relevant  Orders    POC Urinalysis Dipstick (Completed)            Pregnancy at 12w6d  Labs reviewed from New OB Visit.  Counseled on genetic testing, carrier status and option for NT screen  Activity and Exercise discussed.  Patient is on Prenatal vitamins  Iron low and we have her do Iron BID , check  next visit  Return in about 1 month (around 4/7/2024) for Recheck.    Parisa Law MD  03/07/2024

## 2024-03-13 ENCOUNTER — TELEPHONE (OUTPATIENT)
Dept: OBSTETRICS AND GYNECOLOGY | Facility: CLINIC | Age: 29
End: 2024-03-13
Payer: COMMERCIAL

## 2024-03-13 NOTE — TELEPHONE ENCOUNTER
"Provider: DR. PAYTON    Caller: JOSE ADAMS    Relationship to Patient: SELF    Pharmacy: [facility] @ [address]    Phone Number: [phone]    Reason for Call: PT STARTED FEELING UNCOMFORTABLE/HEAVY \"FULL FEELING\" @ [facility]. 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 ADV SHE CANNOT GET A GOOD DEEP BREATH IN.    When was the patient last seen: 03-07-24    When did it start: @ 6:30AM TODAY    Where is it located: STOMACH FEELS HEAVY/UNCOMFORTABLE FEELING    Characteristics of symptom/severity: MILD TO MODERATE    Timing- Is it constant or intermittent: STEADY    What makes it worse:     What makes it better: HELPS TO SIT    What therapies/medications have you tried:       "

## 2024-03-13 NOTE — TELEPHONE ENCOUNTER
"Patient of Dr. Law;  @ 13w5d.   Reports onset this morning of a heavy/full feeling in middle of her chest and upper abdomen, like she could not get a deep breath. States symptoms lasted from 6 am - 9:30 am.   Denies any pain or dizziness. Denies any heartburn or reflux symptoms.    Feels much better now, symptoms are \"very vague now\".   Admits has had similar symptoms with her anxiety in the past and thinks this may be anxiety related. States she has had cardiac evaluation in the past for fast heart rate but everything was normal. States she is not currently taking any medication for anxiety and depression. She has seen a counselor in the past but not recently.   Suggested she may want to resume seeing a counselor. Advised her to monitor and call or go to ER if symptoms worsen or recur. She v/u and agreed.   Discussed with NORMA Mo. She agreed with plan; no additional instructions.   "

## 2024-03-20 ENCOUNTER — ROUTINE PRENATAL (OUTPATIENT)
Dept: OBSTETRICS AND GYNECOLOGY | Facility: CLINIC | Age: 29
End: 2024-03-20
Payer: COMMERCIAL

## 2024-03-20 ENCOUNTER — TELEPHONE (OUTPATIENT)
Dept: OBSTETRICS AND GYNECOLOGY | Facility: CLINIC | Age: 29
End: 2024-03-20

## 2024-03-20 VITALS — WEIGHT: 136.8 LBS | DIASTOLIC BLOOD PRESSURE: 62 MMHG | SYSTOLIC BLOOD PRESSURE: 104 MMHG | BODY MASS INDEX: 23.48 KG/M2

## 2024-03-20 DIAGNOSIS — Z3A.14 14 WEEKS GESTATION OF PREGNANCY: ICD-10-CM

## 2024-03-20 DIAGNOSIS — O26.899 RH NEGATIVE, ANTEPARTUM: ICD-10-CM

## 2024-03-20 DIAGNOSIS — Z67.91 RH NEGATIVE, ANTEPARTUM: ICD-10-CM

## 2024-03-20 DIAGNOSIS — R06.00 DYSPNEA, UNSPECIFIED TYPE: ICD-10-CM

## 2024-03-20 DIAGNOSIS — Z98.891 HISTORY OF C-SECTION: ICD-10-CM

## 2024-03-20 DIAGNOSIS — O99.019 MATERNAL ANEMIA IN PREGNANCY, ANTEPARTUM: Primary | ICD-10-CM

## 2024-03-20 LAB
GLUCOSE UR STRIP-MCNC: NEGATIVE MG/DL
PROT UR STRIP-MCNC: NEGATIVE MG/DL

## 2024-03-20 NOTE — TELEPHONE ENCOUNTER
14w5d Ob patient is calling because on Sunday she experienced being short of breath and felt like she couldn't catch her breath.Patient states she has a really low blood count and wants to know if they could have anything to do with it. Patient also states she would like to know if Dr. Henriquez would put an order in to check her labs.

## 2024-03-20 NOTE — PROGRESS NOTES
"      OB FOLLOW UP  CC- Here for care of pregnancy        Seema Barr is a 28 y.o.  14w5d patient being seen today for shortness of breath.     Patient reports she has been having shortness of breath since .  Patient states that she can not get a \"deep breath\". She also reports an episode of feeling like she was about to pass out. She has been yawning frequently to try to get more air. Her last H&H on  was 6.7 and 25.7. Pt states she does not know if SOB is due to low H&H. Pt states she is currently taking 325 mg of iron BID. She denies recent illness, hx of asthma or seasonal allergies. She does report a hx of anemia but not this low. She does not have a pulse oximeter at home to check her sats.  Patient reports she has intermittent headaches.     Her prenatal care is complicated by (and status) :    Patient Active Problem List   Diagnosis    22 weeks gestation of pregnancy    Echogenic focus of heart of fetus affecting antepartum care of mother    Pregnancy    Encounter for surveillance of contraceptive pills    Women's annual routine gynecological examination    Dysmenorrhea    Female dyspareunia    Anxiety and depression    Encounter for repeat Papanicolaou smear of cervix    Pap smear abnormality of cervix with ASCUS favoring benign    Other fatigue    Pelvic pain    Rh negative, antepartum    History of     Maternal anemia in pregnancy, antepartum         Ultrasound Today: No.    ROS -   Patient Reports :  headaches and shortness of breath   Patient Denies: Loss of Fluid, Vaginal Spotting, Vision Changes, Nausea , Vomiting , Contractions, and skin itching  Fetal Movement :  early   All other systems reviewed and are negative.       The additional following portions of the patient's history were reviewed and updated as appropriate: allergies and current medications.    I have reviewed and agree with the HPI, ROS, and historical information as entered above. Tri Tom, " APRN      /62   Wt 62.1 kg (136 lb 12.8 oz)   LMP 2023   BMI 23.48 kg/m²   O2 sat 100%, pulse 96    EXAM:     Prenatal Vitals  BP: 104/62  Weight: 62.1 kg (136 lb 12.8 oz)   Fetal Heart Rate: 150           Assessment and Plan    Problem List Items Addressed This Visit       Pregnancy    Rh negative, antepartum    History of     Overview     Considering          Maternal anemia in pregnancy, antepartum - Primary    Overview     24 H&H 6.7/25.7  3/20/24: Repeat CBC pending. Consider iron infusion depending on results         Relevant Medications    ferrous sulfate 325 (65 FE) MG tablet    Other Relevant Orders    POC Urinalysis Dipstick (Completed)    CBC (No Diff)    Ferritin     Other Visit Diagnoses       Dyspnea, unspecified type                Pregnancy at 14w5d  Fetal status reassuring.   Repeat CBC, ferritin today. Will consider iron infusion if H&H still low due to being symptomatic.   Advised pt to get a pulse oximeter to be able to monitor O2 sats at home.  Return in about 13 days (around 2024) for ROXI Tom, APRN  2024

## 2024-03-20 NOTE — TELEPHONE ENCOUNTER
Spoke with patient. She is 14w6d. Pt states she is having SOB since Sunday. Pt denies bleeding, leaking, pain. Her last labs were on 2/6 and her hemoglobin was 6.7 and hematocrit was 25.7. Pt states she does not know if SOB is due to low h&h. Pt states she is currently taking 325mg of iron BID. Pt states at her last follow up with Dr. Law, he did not want to take more blood out of her body due to low H/H. Per NORMA Mo, pt needs to be seen for vitals (O2 sat, BP, etc) and possibly labs. Pt v/u and appt made at 1pm with NORMA Mo.

## 2024-03-21 LAB
ERYTHROCYTE [DISTWIDTH] IN BLOOD BY AUTOMATED COUNT: ABNORMAL %
FERRITIN SERPL-MCNC: 16.2 NG/ML (ref 13–150)
HCT VFR BLD AUTO: 35.7 % (ref 34–46.6)
HGB BLD-MCNC: 11.7 G/DL (ref 12–15.9)
MCH RBC QN AUTO: 26.9 PG (ref 26.6–33)
MCHC RBC AUTO-ENTMCNC: 32.8 G/DL (ref 31.5–35.7)
MCV RBC AUTO: 82.1 FL (ref 79–97)
PLATELET # BLD AUTO: 289 10*3/MM3 (ref 140–450)
RBC # BLD AUTO: 4.35 10*6/MM3 (ref 3.77–5.28)
WBC # BLD AUTO: 7.84 10*3/MM3 (ref 3.4–10.8)

## 2024-04-05 ENCOUNTER — ROUTINE PRENATAL (OUTPATIENT)
Dept: OBSTETRICS AND GYNECOLOGY | Facility: CLINIC | Age: 29
End: 2024-04-05
Payer: COMMERCIAL

## 2024-04-05 VITALS — SYSTOLIC BLOOD PRESSURE: 112 MMHG | DIASTOLIC BLOOD PRESSURE: 76 MMHG | BODY MASS INDEX: 23.69 KG/M2 | WEIGHT: 138 LBS

## 2024-04-05 DIAGNOSIS — Z98.891 HISTORY OF C-SECTION: ICD-10-CM

## 2024-04-05 DIAGNOSIS — O99.019 MATERNAL ANEMIA IN PREGNANCY, ANTEPARTUM: ICD-10-CM

## 2024-04-05 DIAGNOSIS — R00.0 TACHYCARDIA: ICD-10-CM

## 2024-04-05 DIAGNOSIS — Z34.90 PRENATAL CARE, ANTEPARTUM: Primary | ICD-10-CM

## 2024-04-05 DIAGNOSIS — Z3A.17 17 WEEKS GESTATION OF PREGNANCY: ICD-10-CM

## 2024-04-05 DIAGNOSIS — Z36.89 ENCOUNTER FOR FETAL ANATOMIC SURVEY: ICD-10-CM

## 2024-04-05 LAB
GLUCOSE UR STRIP-MCNC: NEGATIVE MG/DL
PROT UR STRIP-MCNC: NEGATIVE MG/DL

## 2024-04-05 NOTE — PROGRESS NOTES
OB FOLLOW UP  CC- Here for care of pregnancy        Seema Barr is a 28 y.o.  17w0d patient being seen today for her obstetrical follow up visit. Patient reports random brief weakness and lightheadedness 2-3 times per week. Reports she has a pulse ox at home, and she has noticed her O2 drops to the low 90s and HR is elevated 120-160 during the times of lightheadedness. Denies any chest pain and SOA but feels she can't get a deep breath sometimes. Pt feels she is eating often enough, and snacking often. Also reports she is drinking plenty of water. Reports symptoms have improved since addressing anemia.  Admits to history of anemia and postpartum hypothyroidism.  States she has had prior cardiology workup with unremarkable findings.    O2 98% and  in office today.     Her prenatal care is complicated by (and status) :   Patient Active Problem List   Diagnosis    22 weeks gestation of pregnancy    Echogenic focus of heart of fetus affecting antepartum care of mother    Pregnancy    Encounter for surveillance of contraceptive pills    Women's annual routine gynecological examination    Dysmenorrhea    Female dyspareunia    Anxiety and depression    Encounter for repeat Papanicolaou smear of cervix    Pap smear abnormality of cervix with ASCUS favoring benign    Other fatigue    Pelvic pain    Rh negative, antepartum    History of     Maternal anemia in pregnancy, antepartum       Flu Status: Already given in current flu season  Ultrasound Today: No    AFP: declines    ROS -   Patient Denies: leaking of fluid, vaginal bleeding, dysuria, excessive vomiting, and more than 6 contractions per hour  All other systems reviewed and are negative.       The additional following portions of the patient's history were reviewed and updated as appropriate: allergies, current medications, past family history, past medical history, past social history, past surgical history, and problem list.      I  have reviewed and agree with the HPI, ROS, and historical information as entered above. Ana Rosa Nevarez, APRN          EXAM:     Prenatal Vitals  BP: 112/76  Weight: 62.6 kg (138 lb)   Fetal Heart Rate: 150     Urine Glucose Read-only: Negative  Urine Protein Read-only: Negative       Assessment and Plan    Problem List Items Addressed This Visit       History of     Overview     Considering          Maternal anemia in pregnancy, antepartum    Overview     24 H&H 6.7/25.7  3/20/24: Repeat CBC pending. Consider iron infusion depending on results         Relevant Medications    ferrous sulfate 325 (65 FE) MG tablet    Pregnancy     Other Visit Diagnoses       Prenatal care, antepartum    -  Primary    Relevant Orders    POC Urinalysis Dipstick (Completed)    TSH (Completed)    T4, Free (Completed)    US Ob 14 + Weeks Single or First Gestation    Tachycardia        Relevant Orders    TSH (Completed)    T4, Free (Completed)    Encounter for fetal anatomic survey        Relevant Orders    US Ob 14 + Weeks Single or First Gestation            Pregnancy at 17w0d  Fetal status reassuring.   Counseled on MSAFP alone in relation to OTD and placental issues.  Declined.  Advised to continue iron for anemia.  Advised to increase water intake to  ounces per day.  Thyroid labs ordered today.  Patient informed if symptoms persist we will proceed with cardiology referral.  Turn to care for worsening symptoms.  ED for severe symptoms.  Anatomy scan next visit.   Activity and Exercise discussed.  Patient is on Prenatal vitamins  Discussed/encouraged social distancing/COVID19 precautions; encouraged vaccination when able  Return in about 3 weeks (around 2024) for Thanh Muniz, Ultrasound- anatomy.    NORMA Meneses  2024

## 2024-04-06 LAB
T4 FREE SERPL-MCNC: 1.11 NG/DL (ref 0.82–1.77)
TSH SERPL DL<=0.005 MIU/L-ACNC: 2.33 UIU/ML (ref 0.45–4.5)

## 2024-04-11 ENCOUNTER — TELEPHONE (OUTPATIENT)
Dept: OBSTETRICS AND GYNECOLOGY | Facility: CLINIC | Age: 29
End: 2024-04-11
Payer: COMMERCIAL

## 2024-04-11 NOTE — TELEPHONE ENCOUNTER
Caller: Seema Barr    Relationship to patient: Self    Best call back number: [phone]    Type of visit: OB U/S (ANATOMY SCAN) AND OB FOLLOW UP WITH DR PAYTON    Requested date: ANY     If rescheduling, when is the original appointment: 4/26/24     Additional notes:   PATIENT NEEDS TO RESCHED HER APPT FOR OB FOLLOW UP DUE TO A WORK CONFILCT. PATIENT WOULD LIKE TO SEE DR PAYTON FOR THIS APPT, NOT APRN.

## 2024-05-03 ENCOUNTER — ROUTINE PRENATAL (OUTPATIENT)
Dept: OBSTETRICS AND GYNECOLOGY | Facility: CLINIC | Age: 29
End: 2024-05-03
Payer: COMMERCIAL

## 2024-05-03 VITALS — SYSTOLIC BLOOD PRESSURE: 110 MMHG | WEIGHT: 146.6 LBS | BODY MASS INDEX: 25.16 KG/M2 | DIASTOLIC BLOOD PRESSURE: 78 MMHG

## 2024-05-03 DIAGNOSIS — Z34.92 PRENATAL CARE IN SECOND TRIMESTER: Primary | ICD-10-CM

## 2024-05-03 LAB
GLUCOSE UR STRIP-MCNC: NEGATIVE MG/DL
PROT UR STRIP-MCNC: NEGATIVE MG/DL

## 2024-05-03 NOTE — PROGRESS NOTES
OB FOLLOW UP  CC- Here for care of pregnancy        Seema Barr is a 28 y.o.  21w0d patient being seen today for her obstetrical follow up visit. Patient reports a few headaches over the last 2 weeks that have been relieved with increased fluids and rest..     Her prenatal care is complicated by (and status) :   Patient Active Problem List   Diagnosis    22 weeks gestation of pregnancy    Echogenic focus of heart of fetus affecting antepartum care of mother    Pregnancy    Encounter for surveillance of contraceptive pills    Women's annual routine gynecological examination    Dysmenorrhea    Female dyspareunia    Anxiety and depression    Encounter for repeat Papanicolaou smear of cervix    Pap smear abnormality of cervix with ASCUS favoring benign    Other fatigue    Pelvic pain    Rh negative, antepartum    History of     Maternal anemia in pregnancy, antepartum       Flu Status: Already given in current flu season  Ultrasound Today: Yes, fetal anatomy survey  AFP was declined.    ROS -     Patient Denies: leaking of fluid, vaginal bleeding, dysuria, excessive vomiting, and more than 6 contractions per hour  Fetal Movement : Yes  All other systems reviewed and are negative.       The additional following portions of the patient's history were reviewed and updated as appropriate: allergies and current medications.      I have reviewed and agree with the HPI, ROS, and historical information as entered above. Parisa Law MD      /78   Wt 66.5 kg (146 lb 9.6 oz)   LMP 2023   BMI 25.16 kg/m²       EXAM:     Prenatal Vitals  BP: 110/78  Weight: 66.5 kg (146 lb 9.6 oz)   Fetal Heart Rate: 145          Urine Glucose Read-only: Negative  Urine Protein Read-only: Negative       Assessment and Plan    Problem List Items Addressed This Visit    None  Visit Diagnoses       Prenatal care in second trimester    -  Primary    Relevant Orders    POC Urinalysis Dipstick (Completed)             Pregnancy at 21w0d  Anatomy scan today is complete and appear within normal limits.  Fetal status reassuring.   Activity and Exercise discussed.  Patient is on Prenatal vitamins  Return in about 1 month (around 6/3/2024).      Parisa Law MD  05/03/2024

## 2024-05-30 ENCOUNTER — ROUTINE PRENATAL (OUTPATIENT)
Dept: OBSTETRICS AND GYNECOLOGY | Facility: CLINIC | Age: 29
End: 2024-05-30
Payer: COMMERCIAL

## 2024-05-30 VITALS — DIASTOLIC BLOOD PRESSURE: 76 MMHG | SYSTOLIC BLOOD PRESSURE: 114 MMHG | WEIGHT: 152 LBS | BODY MASS INDEX: 26.09 KG/M2

## 2024-05-30 DIAGNOSIS — Z34.92 PRENATAL CARE IN SECOND TRIMESTER: Primary | ICD-10-CM

## 2024-05-30 LAB
GLUCOSE UR STRIP-MCNC: NEGATIVE MG/DL
PROT UR STRIP-MCNC: NEGATIVE MG/DL

## 2024-05-30 NOTE — PROGRESS NOTES
OB FOLLOW UP  CC- Here for care of pregnancy        Seema Barr is a 28 y.o.  24w6d patient being seen today for her obstetrical follow up visit. Patient reports no complaints.     Her prenatal care is complicated by (and status) : see below.  Patient Active Problem List   Diagnosis    22 weeks gestation of pregnancy    Echogenic focus of heart of fetus affecting antepartum care of mother    Pregnancy    Encounter for surveillance of contraceptive pills    Women's annual routine gynecological examination    Dysmenorrhea    Female dyspareunia    Anxiety and depression    Encounter for repeat Papanicolaou smear of cervix    Pap smear abnormality of cervix with ASCUS favoring benign    Other fatigue    Pelvic pain    Rh negative, antepartum    History of     Maternal anemia in pregnancy, antepartum       Ultrasound Today: Yes. Repeat anatomy.   Reviewed 1 hr glucose testing, TDAP, and Rhogam next visit.    ROS -   Patient Denies: leaking of fluid, vaginal bleeding, dysuria, excessive vomiting, and more than 6 contractions per hour  Fetal Movement : normal  All other systems reviewed and are negative.       The additional following portions of the patient's history were reviewed and updated as appropriate: allergies and current medications.      I have reviewed and agree with the HPI, ROS, and historical information as entered above. Parisa Law MD      /76   Wt 68.9 kg (152 lb)   LMP 2023   BMI 26.09 kg/m²       EXAM:     Prenatal Vitals  BP: 114/76  Weight: 68.9 kg (152 lb)   Fetal Heart Rate: 153               Urine Glucose Read-only: Negative  Urine Protein Read-only: Negative       Assessment and Plan    Problem List Items Addressed This Visit    None  Visit Diagnoses       Prenatal care in second trimester    -  Primary    Relevant Orders    POC Urinalysis Dipstick (Completed)        Us is nl     Pregnancy at 24w6d  Fetal status reassuring.  anatomy scan completed today  and within normal limits.  1 hour gtt, CBC, Antibody screen, TDAP, and RPR next visit. Instructions given  Discussed/encouraged TDAP vaccination after 28 weeks  Activity and Exercise discussed.  Return in about 1 month (around 6/30/2024).      Parisa Law MD  05/30/2024

## 2024-06-27 ENCOUNTER — ROUTINE PRENATAL (OUTPATIENT)
Dept: OBSTETRICS AND GYNECOLOGY | Facility: CLINIC | Age: 29
End: 2024-06-27
Payer: COMMERCIAL

## 2024-06-27 VITALS — DIASTOLIC BLOOD PRESSURE: 72 MMHG | BODY MASS INDEX: 27.29 KG/M2 | SYSTOLIC BLOOD PRESSURE: 114 MMHG | WEIGHT: 159 LBS

## 2024-06-27 DIAGNOSIS — Z3A.28 28 WEEKS GESTATION OF PREGNANCY: ICD-10-CM

## 2024-06-27 DIAGNOSIS — Z34.93 PRENATAL CARE IN THIRD TRIMESTER: Primary | ICD-10-CM

## 2024-06-27 DIAGNOSIS — Z23 NEED FOR DIPHTHERIA-TETANUS-PERTUSSIS (TDAP) VACCINE: ICD-10-CM

## 2024-06-27 LAB
ERYTHROCYTE [DISTWIDTH] IN BLOOD BY AUTOMATED COUNT: 12.5 % (ref 12.3–15.4)
GLUCOSE 1H P 50 G GLC PO SERPL-MCNC: 64 MG/DL (ref 65–139)
GLUCOSE UR STRIP-MCNC: NEGATIVE MG/DL
HCT VFR BLD AUTO: 36.6 % (ref 34–46.6)
HGB BLD-MCNC: 12.7 G/DL (ref 12–15.9)
MCH RBC QN AUTO: 33.4 PG (ref 26.6–33)
MCHC RBC AUTO-ENTMCNC: 34.7 G/DL (ref 31.5–35.7)
MCV RBC AUTO: 96.3 FL (ref 79–97)
PLATELET # BLD AUTO: 270 10*3/MM3 (ref 140–450)
PROT UR STRIP-MCNC: NEGATIVE MG/DL
RBC # BLD AUTO: 3.8 10*6/MM3 (ref 3.77–5.28)
WBC # BLD AUTO: 9.33 10*3/MM3 (ref 3.4–10.8)

## 2024-06-27 NOTE — PROGRESS NOTES
OB FOLLOW UP  CC- Here for care of pregnancy        Seema Barr is a 28 y.o.  28w6d patient being seen today for her obstetrical follow up. Patient reports headaches that do go away with rest/increase water, back/hip pain.      Patient undergoing Glucola testing today. She is due for her testing at 11:45.       MBT: O-  Rhogam: will be given today.  28 week packet: reviewed with patient , counseled on fetal movement , pediatrician list reviewed, breast pump discussed, and childbirth classes reviewed  TDAP: given today  Flu Status:  not currently flu season  Ultrasound Today: No    Her prenatal care is complicated by (and status) : see below.  Patient Active Problem List   Diagnosis    22 weeks gestation of pregnancy    Echogenic focus of heart of fetus affecting antepartum care of mother    Pregnancy    Encounter for surveillance of contraceptive pills    Women's annual routine gynecological examination    Dysmenorrhea    Female dyspareunia    Anxiety and depression    Encounter for repeat Papanicolaou smear of cervix    Pap smear abnormality of cervix with ASCUS favoring benign    Other fatigue    Pelvic pain    Rh negative, antepartum    History of     Maternal anemia in pregnancy, antepartum         ROS -   Patient Denies: Loss of Fluid, Vaginal Spotting, Vision Changes, Nausea , Vomiting , Contractions, Epigastric pain, and skin itching  Fetal Movement : normal    The additional following portions of the patient's history were reviewed and updated as appropriate: allergies and current medications.    I have reviewed and agree with the HPI, ROS, and historical information as entered above. Parisa Law MD      /72   Wt 72.1 kg (159 lb)   LMP 2023   BMI 27.29 kg/m²         EXAM:     Prenatal Vitals  BP: 114/72  Weight: 72.1 kg (159 lb)   Fetal Heart Rate: 160               Urine Glucose Read-only: Negative  Urine Protein Read-only: Negative         Assessment and  Plan    Problem List Items Addressed This Visit          Gravid and     Pregnancy     Other Visit Diagnoses       Prenatal care in third trimester    -  Primary    Relevant Orders    Rhogam Immune Globulin Immunization    POC Urinalysis Dipstick (Completed)    Tdap Vaccine => 8yo IM (BOOSTRIX/ADACEL)    Need for diphtheria-tetanus-pertussis (Tdap) vaccine        Relevant Orders    Tdap Vaccine => 8yo IM (BOOSTRIX/ADACEL)            Pregnancy at 28w6d  1 hr Glucola, CBC, RPR. Antibody screen and TDAP today  Fetal movement/PTL or Labor precautions  Lab(s) Ordered  Medication(s) Ordered  Activity and Exercise discussed.  Return in about 1 month (around 2024).        Parisa Law MD  2024

## 2024-06-28 ENCOUNTER — TELEPHONE (OUTPATIENT)
Dept: OBSTETRICS AND GYNECOLOGY | Facility: CLINIC | Age: 29
End: 2024-06-28
Payer: COMMERCIAL

## 2024-06-28 LAB
BLD GP AB SCN SERPL QL: NEGATIVE
RPR SER QL: NON REACTIVE

## 2024-07-26 ENCOUNTER — ROUTINE PRENATAL (OUTPATIENT)
Dept: OBSTETRICS AND GYNECOLOGY | Facility: CLINIC | Age: 29
End: 2024-07-26
Payer: COMMERCIAL

## 2024-07-26 VITALS — WEIGHT: 164 LBS | DIASTOLIC BLOOD PRESSURE: 70 MMHG | BODY MASS INDEX: 28.15 KG/M2 | SYSTOLIC BLOOD PRESSURE: 110 MMHG

## 2024-07-26 DIAGNOSIS — Z3A.33 33 WEEKS GESTATION OF PREGNANCY: Primary | ICD-10-CM

## 2024-07-26 LAB
GLUCOSE UR STRIP-MCNC: NEGATIVE MG/DL
PROT UR STRIP-MCNC: NEGATIVE MG/DL

## 2024-07-26 NOTE — PROGRESS NOTES
OB FOLLOW UP  CC- Here for care of pregnancy        Seema Barr is a 28 y.o.  33w0d patient being seen today for her obstetrical follow up visit. Patient reports BH contractions and increased vaginal pressure.     Her prenatal care is complicated by (and status) :  see below.  Patient Active Problem List   Diagnosis    22 weeks gestation of pregnancy    Echogenic focus of heart of fetus affecting antepartum care of mother    Pregnancy    Encounter for surveillance of contraceptive pills    Women's annual routine gynecological examination    Dysmenorrhea    Female dyspareunia    Anxiety and depression    Encounter for repeat Papanicolaou smear of cervix    Pap smear abnormality of cervix with ASCUS favoring benign    Other fatigue    Pelvic pain    Rh negative, antepartum    History of     Maternal anemia in pregnancy, antepartum       TDAP status: received at last visit  Rhogam status: was given  28 week labs: Reviewed and normal  Ultrasound Today: No  Non Stress Test: No.      ROS -   Patient Denies: Loss of Fluid, Vaginal Spotting, Vision Changes, and Headaches  Fetal Movement : normal  All other systems reviewed and are negative.       The additional following portions of the patient's history were reviewed and updated as appropriate: allergies and current medications.    I have reviewed and agree with the HPI, ROS, and historical information as entered above. Parisa Law MD      /70   Wt 74.4 kg (164 lb)   LMP 2023   BMI 28.15 kg/m²         EXAM:     Prenatal Vitals  BP: 110/70  Weight: 74.4 kg (164 lb)   Fetal Heart Rate: 156               Urine Glucose Read-only: Negative  Urine Protein Read-only: Negative           Assessment and Plan    Problem List Items Addressed This Visit          Gravid and     Pregnancy - Primary    Relevant Orders    POC Urinalysis Dipstick (Completed)       Pregnancy at 33w0d  Fetal status reassuring.  28 week labs reviewed.     Activity and Exercise discussed.  Fetal movement/PTL or Labor precautions  Patient is on Prenatal vitamins  Return in about 2 weeks (around 8/9/2024).    Parisa Law MD  07/26/2024

## 2024-08-08 ENCOUNTER — ROUTINE PRENATAL (OUTPATIENT)
Dept: OBSTETRICS AND GYNECOLOGY | Facility: CLINIC | Age: 29
End: 2024-08-08
Payer: COMMERCIAL

## 2024-08-08 VITALS — SYSTOLIC BLOOD PRESSURE: 110 MMHG | BODY MASS INDEX: 29.18 KG/M2 | DIASTOLIC BLOOD PRESSURE: 68 MMHG | WEIGHT: 170 LBS

## 2024-08-08 DIAGNOSIS — Z34.93 PRENATAL CARE IN THIRD TRIMESTER: Primary | ICD-10-CM

## 2024-08-08 LAB
GLUCOSE UR STRIP-MCNC: NEGATIVE MG/DL
PROT UR STRIP-MCNC: NEGATIVE MG/DL

## 2024-08-08 NOTE — PROGRESS NOTES
OB FOLLOW UP  CC- Here for care of pregnancy        Seema Barr is a 29 y.o.  34w6d patient being seen today for her obstetrical follow up visit. Patient reports BH contractions, lower back pain, vaginal pressure. She reports a rash occasionally on her belly that will cause itching.  Patient reports she will have shortness of breath easier. She will feel her heart racing when this happens.     Her prenatal care is complicated by (and status) : see below.  Patient Active Problem List   Diagnosis    22 weeks gestation of pregnancy    Echogenic focus of heart of fetus affecting antepartum care of mother    Pregnancy    Encounter for surveillance of contraceptive pills    Women's annual routine gynecological examination    Dysmenorrhea    Female dyspareunia    Anxiety and depression    Encounter for repeat Papanicolaou smear of cervix    Pap smear abnormality of cervix with ASCUS favoring benign    Other fatigue    Pelvic pain    Rh negative, antepartum    History of     Maternal anemia in pregnancy, antepartum       Flu Status:  not currently flu season  Ultrasound Today: No  Non Stress Test: No.    ROS -   Patient Denies: Loss of Fluid, Vaginal Spotting, Vision Changes, Headaches, Nausea , Vomiting , Contractions, Epigastric pain, and skin itching  Fetal Movement : normal  All other systems reviewed and are negative.       The additional following portions of the patient's history were reviewed and updated as appropriate: allergies and current medications.    I have reviewed and agree with the HPI, ROS, and historical information as entered above. Parisa Law MD      LMP 2023       EXAM:                                     Assessment and Plan    Problem List Items Addressed This Visit    None      Pregnancy at 34w6d  Fetal status reassuring.   Activity and Exercise discussed.  Fetal movement/PTL or Labor precautions  Patient is on Prenatal vitamins  GBS next visit  No follow-ups on  file.    Libertad Aranda RN  08/08/2024

## 2024-08-16 ENCOUNTER — LAB (OUTPATIENT)
Dept: LAB | Facility: HOSPITAL | Age: 29
End: 2024-08-16
Payer: COMMERCIAL

## 2024-08-16 ENCOUNTER — ROUTINE PRENATAL (OUTPATIENT)
Dept: OBSTETRICS AND GYNECOLOGY | Facility: CLINIC | Age: 29
End: 2024-08-16
Payer: COMMERCIAL

## 2024-08-16 VITALS — BODY MASS INDEX: 28.84 KG/M2 | DIASTOLIC BLOOD PRESSURE: 70 MMHG | WEIGHT: 168 LBS | SYSTOLIC BLOOD PRESSURE: 117 MMHG

## 2024-08-16 DIAGNOSIS — Z34.93 PRENATAL CARE IN THIRD TRIMESTER: ICD-10-CM

## 2024-08-16 DIAGNOSIS — Z3A.33 33 WEEKS GESTATION OF PREGNANCY: Primary | ICD-10-CM

## 2024-08-16 LAB
GLUCOSE UR STRIP-MCNC: NEGATIVE MG/DL
PROT UR STRIP-MCNC: NEGATIVE MG/DL

## 2024-08-16 PROCEDURE — 87081 CULTURE SCREEN ONLY: CPT

## 2024-08-16 NOTE — PROGRESS NOTES
"    OB FOLLOW UP  CC- Here for care of pregnancy        Seema Barr is a 29 y.o.  36w0d patient being seen today for her obstetrical follow up visit. Patient reports some itching on her belly and rash that occurs mostly at night time. Patient also describes a \"weird uncomfortable\" feeling in the upper sides of her belly. Patient has noticed this new feeling over the last week or two. Patient reports she has been very stressed over the last week or two and has even had a panic attack within the last week.     Her prenatal care is complicated by (and status) : see below.  Patient Active Problem List   Diagnosis    22 weeks gestation of pregnancy    Echogenic focus of heart of fetus affecting antepartum care of mother    Pregnancy    Encounter for surveillance of contraceptive pills    Women's annual routine gynecological examination    Dysmenorrhea    Female dyspareunia    Anxiety and depression    Encounter for repeat Papanicolaou smear of cervix    Pap smear abnormality of cervix with ASCUS favoring benign    Other fatigue    Pelvic pain    Rh negative, antepartum    History of     Maternal anemia in pregnancy, antepartum       GBS Status: Done Today. She is allergic to PCN.    Allergies   Allergen Reactions    Amoxicillin Hives    Penicillins Hives     Childhood allergy          Her Delivery Plan is: Repeat . Scheduled    US today: no  Non Stress Test: No    ROS -   Patient Denies: Loss of Fluid, Vaginal Spotting, Vision Changes, Headaches, Nausea , Vomiting , Contractions, and Epigastric pain  Fetal Movement : normal  All other systems reviewed and are negative.       The additional following portions of the patient's history were reviewed and updated as appropriate: allergies and current medications.    I have reviewed and agree with the HPI, ROS, and historical information as entered above. Parisa Law MD        EXAM:     Prenatal Vitals  BP: 117/70  Weight: 76.2 kg (168 lb) "   Fetal Heart Rate: 145              Urine Glucose Read-only: Negative  Urine Protein Read-only: Negative           Assessment and Plan    Problem List Items Addressed This Visit          Gravid and     Pregnancy - Primary     Other Visit Diagnoses       Prenatal care in third trimester        Relevant Orders    POC Urinalysis Dipstick (Completed)            Pregnancy at 36w0d  Fetal status reassuring.   Reviewed Pre-eclampsia signs/symptoms  Pt very david and emotional and we ciunselled her   Delivery options reviewed with patient  Signs of labor reviewed  Kick counts reviewed  Activity and Exercise discussed.  Return in about 1 week (around 2024) for us then.    Parisa Law MD  2024

## 2024-08-19 LAB — BACTERIA SPEC AEROBE CULT: NORMAL

## 2024-08-23 ENCOUNTER — ROUTINE PRENATAL (OUTPATIENT)
Dept: OBSTETRICS AND GYNECOLOGY | Facility: CLINIC | Age: 29
End: 2024-08-23
Payer: COMMERCIAL

## 2024-08-23 VITALS — SYSTOLIC BLOOD PRESSURE: 126 MMHG | DIASTOLIC BLOOD PRESSURE: 80 MMHG | WEIGHT: 170 LBS | BODY MASS INDEX: 29.18 KG/M2

## 2024-08-23 DIAGNOSIS — Z34.90 PREGNANCY, UNSPECIFIED GESTATIONAL AGE: Primary | ICD-10-CM

## 2024-08-23 DIAGNOSIS — Z34.93 PRENATAL CARE IN THIRD TRIMESTER: ICD-10-CM

## 2024-08-23 LAB
GLUCOSE UR STRIP-MCNC: NEGATIVE MG/DL
PROT UR STRIP-MCNC: NEGATIVE MG/DL

## 2024-08-23 NOTE — PROGRESS NOTES
OB FOLLOW UP  CC- Here for care of pregnancy        Seema Barr is a 29 y.o.  37w0d patient being seen today for her obstetrical follow up visit. Patient reports no complaints. Patient reports some severe pelvic pain as well as vaginal pressure and pain over the last week.     Her prenatal care is complicated by (and status) : see below.  Patient Active Problem List   Diagnosis    22 weeks gestation of pregnancy    Echogenic focus of heart of fetus affecting antepartum care of mother    Pregnancy    Encounter for surveillance of contraceptive pills    Women's annual routine gynecological examination    Dysmenorrhea    Female dyspareunia    Anxiety and depression    Encounter for repeat Papanicolaou smear of cervix    Pap smear abnormality of cervix with ASCUS favoring benign    Other fatigue    Pelvic pain    Rh negative, antepartum    History of     Maternal anemia in pregnancy, antepartum       GBS Status:   Group B Strep Culture   Date Value Ref Range Status   2024 No Group B Streptococcus Isolated at 2 days  Final         Allergies   Allergen Reactions    Amoxicillin Hives    Penicillins Hives     Childhood allergy          Flu Status: Desires at future appt  Her Delivery Plan is: Repeat . Scheduled  9/10/24   today: yes, fetal growth, FHR: 134, AF: normal  Non Stress Test: No.    ROS -   Patient Denies: Loss of Fluid, Vaginal Spotting, Vision Changes, Headaches, Nausea , Vomiting , Epigastric pain, and skin itching  Fetal Movement : normal  All other systems reviewed and are negative.       The additional following portions of the patient's history were reviewed and updated as appropriate: allergies and current medications.    I have reviewed and agree with the HPI, ROS, and historical information as entered above. aPrisa Law MD        EXAM:     Prenatal Vitals  BP: 126/80  Weight: 77.1 kg (170 lb)   Fetal Heart Rate: 134              Urine Glucose Read-only:  Negative  Urine Protein Read-only: Negative           Assessment and Plan    Problem List Items Addressed This Visit          Gravid and     Pregnancy - Primary     Other Visit Diagnoses       Prenatal care in third trimester        Relevant Orders    POC Urinalysis Dipstick (Completed)            Pregnancy at 37w0d  Fetal status reassuring.   Reviewed Pre-eclampsia signs/symptoms  Reviewed upcoming c/s and PAT instructions with patient. Arrival time and NPO status reviewed.   Delivery options reviewed with patient  Signs of labor reviewed  Kick counts reviewed  Activity and Exercise discussed.  Return in about 1 week (around 2024).    Parisa Law MD  2024

## 2024-08-28 ENCOUNTER — TELEPHONE (OUTPATIENT)
Dept: OBSTETRICS AND GYNECOLOGY | Facility: CLINIC | Age: 29
End: 2024-08-28
Payer: COMMERCIAL

## 2024-08-28 NOTE — TELEPHONE ENCOUNTER
Thanh pt. Currently 37w5d. Patient called reporting increase in swelling in legs and feet over the past couple of days. Patient is not able to check her BP at this time but will be able to check her BP in a couple of hours. Denies any headaches or vision changes. Discussed with ECHO GREEN and she agreed that if pt BP is over 140 systolic or 90 diastolic, or has any signs of pre-eclampsia to be seen. If not, she can hydrate, rest, elevate extremities, and keep appointment with Dr. Law on Friday. Verbalized understanding.

## 2024-08-28 NOTE — TELEPHONE ENCOUNTER
Provider: DR PAYTON    Caller: JOSE ADAMS    Relationship to Patient: SELF     Pharmacy: [facility]    Phone Number: [phone]    Reason for Call: PT IS ALMOST 38 WKS PREGNANT - OVER THE LAST COUPLE OF DAYS PT HAS NOTICED SIGNIFICANT CHANGES IN SWELLING IN HER FEET AND LEGS - IT HAS WENT FROM MILD TO MODERATE/SEVERE - IT HAS GAINED HER ATTENTION AND BECAME A LITTLE ALARMING - THERE HAS BEEN NO DIFFERENCES IN PTS DIET OR LIFESTYLE LATELY SHE HAS BEEN A LITTLE MORE HUNGRIER THAN NORMAL - PT WOULD LIKE TO BE ADVISED - SHE LIVES TWO HOURS AWAY AND WOULD LIKE TO BE PREPARED FOR ANYTHING PLEASE CALL

## 2024-08-30 ENCOUNTER — ROUTINE PRENATAL (OUTPATIENT)
Dept: OBSTETRICS AND GYNECOLOGY | Facility: CLINIC | Age: 29
End: 2024-08-30
Payer: COMMERCIAL

## 2024-08-30 VITALS — SYSTOLIC BLOOD PRESSURE: 122 MMHG | WEIGHT: 172 LBS | BODY MASS INDEX: 29.52 KG/M2 | DIASTOLIC BLOOD PRESSURE: 82 MMHG

## 2024-08-30 DIAGNOSIS — Z3A.38 38 WEEKS GESTATION OF PREGNANCY: Primary | ICD-10-CM

## 2024-08-30 NOTE — PROGRESS NOTES
OB FOLLOW UP  CC- Here for care of pregnancy        Seema Barr is a 29 y.o.  38w0d patient being seen today for her obstetrical follow up visit. Patient reports increased vaginal pressure, occasional contractions. Patient c/o increased swelling this week. States BP tues 130s/90, last night /70. C/o mild h/a that resolved.      Her prenatal care is complicated by (and status) : see below.  Patient Active Problem List   Diagnosis    22 weeks gestation of pregnancy    Echogenic focus of heart of fetus affecting antepartum care of mother    Pregnancy    Encounter for surveillance of contraceptive pills    Women's annual routine gynecological examination    Dysmenorrhea    Female dyspareunia    Anxiety and depression    Encounter for repeat Papanicolaou smear of cervix    Pap smear abnormality of cervix with ASCUS favoring benign    Other fatigue    Pelvic pain    Rh negative, antepartum    History of     Maternal anemia in pregnancy, antepartum       GBS Status:   Group B Strep Culture   Date Value Ref Range Status   2024 No Group B Streptococcus Isolated at 2 days  Final         Allergies   Allergen Reactions    Amoxicillin Hives    Penicillins Hives     Childhood allergy          Her Delivery Plan is: Repeat . Scheduled    US today: no  Non Stress Test: No.    ROS -   Patient Denies: Loss of Fluid, Vaginal Spotting, and Vision Changes  Fetal Movement : normal  All other systems reviewed and are negative.       The additional following portions of the patient's history were reviewed and updated as appropriate: allergies and current medications.    I have reviewed and agree with the HPI, ROS, and historical information as entered above. Parisa Law MD        EXAM: cx 1 cm     Prenatal Vitals  BP: 122/82  Weight: 78 kg (172 lb)   Fetal Heart Rate: 140                          Assessment and Plan    Problem List Items Addressed This Visit          Gravid and      Pregnancy - Primary       Pregnancy at 38w0d  Fetal status reassuring.   Reviewed Pre-eclampsia signs/symptoms  Reviewed upcoming IOL with patient. Instructions given.  Delivery options reviewed with patient  Signs of labor reviewed  Kick counts reviewed  Activity and Exercise discussed.  No follow-ups on file.    Parisa Law MD  08/30/2024

## 2024-09-06 ENCOUNTER — ANESTHESIA EVENT (OUTPATIENT)
Dept: LABOR AND DELIVERY | Facility: HOSPITAL | Age: 29
End: 2024-09-06
Payer: COMMERCIAL

## 2024-09-06 ENCOUNTER — ANESTHESIA (OUTPATIENT)
Dept: LABOR AND DELIVERY | Facility: HOSPITAL | Age: 29
End: 2024-09-06
Payer: COMMERCIAL

## 2024-09-06 ENCOUNTER — PREP FOR SURGERY (OUTPATIENT)
Dept: OTHER | Facility: HOSPITAL | Age: 29
End: 2024-09-06
Payer: COMMERCIAL

## 2024-09-06 ENCOUNTER — ROUTINE PRENATAL (OUTPATIENT)
Dept: OBSTETRICS AND GYNECOLOGY | Facility: CLINIC | Age: 29
End: 2024-09-06
Payer: COMMERCIAL

## 2024-09-06 ENCOUNTER — DOCUMENTATION (OUTPATIENT)
Dept: OBSTETRICS AND GYNECOLOGY | Facility: CLINIC | Age: 29
End: 2024-09-06

## 2024-09-06 ENCOUNTER — HOSPITAL ENCOUNTER (INPATIENT)
Facility: HOSPITAL | Age: 29
LOS: 2 days | Discharge: HOME | End: 2024-09-08
Attending: OBSTETRICS & GYNECOLOGY | Admitting: OBSTETRICS & GYNECOLOGY
Payer: COMMERCIAL

## 2024-09-06 VITALS — DIASTOLIC BLOOD PRESSURE: 100 MMHG | WEIGHT: 175 LBS | BODY MASS INDEX: 30.04 KG/M2 | SYSTOLIC BLOOD PRESSURE: 138 MMHG

## 2024-09-06 DIAGNOSIS — O13.3 PREGNANCY-INDUCED HYPERTENSION IN THIRD TRIMESTER: Primary | ICD-10-CM

## 2024-09-06 DIAGNOSIS — O13.3 PREGNANCY-INDUCED HYPERTENSION IN THIRD TRIMESTER: ICD-10-CM

## 2024-09-06 DIAGNOSIS — Z34.90 PREGNANCY, UNSPECIFIED GESTATIONAL AGE: Primary | ICD-10-CM

## 2024-09-06 PROBLEM — Z98.891 HISTORY OF C-SECTION: Status: ACTIVE | Noted: 2024-09-06

## 2024-09-06 PROBLEM — Z3A.39 39 WEEKS GESTATION OF PREGNANCY: Status: ACTIVE | Noted: 2024-09-06

## 2024-09-06 PROBLEM — O34.219 PATIENT DECLINES VAGINAL BIRTH AFTER CESAREAN SECTION (VBAC): Status: ACTIVE | Noted: 2024-09-06

## 2024-09-06 PROBLEM — O36.8130 DECREASED FETAL MOVEMENTS IN THIRD TRIMESTER: Status: ACTIVE | Noted: 2024-09-06

## 2024-09-06 PROBLEM — O13.9 PREGNANCY INDUCED HYPERTENSION: Status: ACTIVE | Noted: 2024-09-06

## 2024-09-06 LAB
ABO GROUP BLD: NORMAL
ALP SERPL-CCNC: 152 U/L (ref 39–117)
ALT SERPL W P-5'-P-CCNC: 17 U/L (ref 1–33)
AST SERPL-CCNC: 20 U/L (ref 1–32)
BILIRUB SERPL-MCNC: 0.2 MG/DL (ref 0–1.2)
BLD GP AB SCN SERPL QL: POSITIVE
CREAT SERPL-MCNC: 0.48 MG/DL (ref 0.57–1)
DEPRECATED RDW RBC AUTO: 40.2 FL (ref 37–54)
ERYTHROCYTE [DISTWIDTH] IN BLOOD BY AUTOMATED COUNT: 11.7 % (ref 12.3–15.4)
GLUCOSE UR STRIP-MCNC: NEGATIVE MG/DL
HCT VFR BLD AUTO: 39.2 % (ref 34–46.6)
HGB BLD-MCNC: 13.5 G/DL (ref 12–15.9)
LDH SERPL-CCNC: 145 U/L (ref 135–214)
MCH RBC QN AUTO: 32.5 PG (ref 26.6–33)
MCHC RBC AUTO-ENTMCNC: 34.4 G/DL (ref 31.5–35.7)
MCV RBC AUTO: 94.2 FL (ref 79–97)
PLATELET # BLD AUTO: 235 10*3/MM3 (ref 140–450)
PMV BLD AUTO: 11.5 FL (ref 6–12)
PROT UR STRIP-MCNC: NEGATIVE MG/DL
RBC # BLD AUTO: 4.16 10*6/MM3 (ref 3.77–5.28)
RESIDUAL RHIG DETECTED: NORMAL
RH BLD: NEGATIVE
T&S EXPIRATION DATE: NORMAL
URATE SERPL-MCNC: 4 MG/DL (ref 2.4–5.7)
WBC NRBC COR # BLD AUTO: 9.86 10*3/MM3 (ref 3.4–10.8)

## 2024-09-06 PROCEDURE — 86900 BLOOD TYPING SEROLOGIC ABO: CPT | Performed by: OBSTETRICS & GYNECOLOGY

## 2024-09-06 PROCEDURE — 25010000002 OXYTOCIN PER 10 UNITS: Performed by: ANESTHESIOLOGY

## 2024-09-06 PROCEDURE — 25810000003 LACTATED RINGERS PER 1000 ML: Performed by: ANESTHESIOLOGY

## 2024-09-06 PROCEDURE — 84550 ASSAY OF BLOOD/URIC ACID: CPT | Performed by: OBSTETRICS & GYNECOLOGY

## 2024-09-06 PROCEDURE — 86850 RBC ANTIBODY SCREEN: CPT | Performed by: OBSTETRICS & GYNECOLOGY

## 2024-09-06 PROCEDURE — 25010000002 ONDANSETRON PER 1 MG: Performed by: ANESTHESIOLOGY

## 2024-09-06 PROCEDURE — 84460 ALANINE AMINO (ALT) (SGPT): CPT | Performed by: OBSTETRICS & GYNECOLOGY

## 2024-09-06 PROCEDURE — 25010000002 FENTANYL CITRATE (PF) 100 MCG/2ML SOLUTION: Performed by: ANESTHESIOLOGY

## 2024-09-06 PROCEDURE — 25010000002 METOCLOPRAMIDE PER 10 MG: Performed by: ANESTHESIOLOGY

## 2024-09-06 PROCEDURE — 25010000002 MIDAZOLAM PER 1 MG: Performed by: ANESTHESIOLOGY

## 2024-09-06 PROCEDURE — 85027 COMPLETE CBC AUTOMATED: CPT | Performed by: OBSTETRICS & GYNECOLOGY

## 2024-09-06 PROCEDURE — 84075 ASSAY ALKALINE PHOSPHATASE: CPT | Performed by: OBSTETRICS & GYNECOLOGY

## 2024-09-06 PROCEDURE — 82565 ASSAY OF CREATININE: CPT | Performed by: OBSTETRICS & GYNECOLOGY

## 2024-09-06 PROCEDURE — 25010000002 CEFAZOLIN PER 500 MG: Performed by: OBSTETRICS & GYNECOLOGY

## 2024-09-06 PROCEDURE — 25810000003 LACTATED RINGERS PER 1000 ML: Performed by: OBSTETRICS & GYNECOLOGY

## 2024-09-06 PROCEDURE — 25010000002 KETOROLAC TROMETHAMINE PER 15 MG: Performed by: OBSTETRICS & GYNECOLOGY

## 2024-09-06 PROCEDURE — 25010000002 BUPIVACAINE IN DEXTROSE 0.75-8.25 % SOLUTION: Performed by: ANESTHESIOLOGY

## 2024-09-06 PROCEDURE — 83615 LACTATE (LD) (LDH) ENZYME: CPT | Performed by: OBSTETRICS & GYNECOLOGY

## 2024-09-06 PROCEDURE — 59510 CESAREAN DELIVERY: CPT | Performed by: OBSTETRICS & GYNECOLOGY

## 2024-09-06 PROCEDURE — 86780 TREPONEMA PALLIDUM: CPT | Performed by: OBSTETRICS & GYNECOLOGY

## 2024-09-06 PROCEDURE — 82247 BILIRUBIN TOTAL: CPT | Performed by: OBSTETRICS & GYNECOLOGY

## 2024-09-06 PROCEDURE — 25010000002 MORPHINE PER 10 MG: Performed by: ANESTHESIOLOGY

## 2024-09-06 PROCEDURE — 86870 RBC ANTIBODY IDENTIFICATION: CPT | Performed by: OBSTETRICS & GYNECOLOGY

## 2024-09-06 PROCEDURE — 86901 BLOOD TYPING SEROLOGIC RH(D): CPT | Performed by: OBSTETRICS & GYNECOLOGY

## 2024-09-06 PROCEDURE — 84450 TRANSFERASE (AST) (SGOT): CPT | Performed by: OBSTETRICS & GYNECOLOGY

## 2024-09-06 PROCEDURE — S0260 H&P FOR SURGERY: HCPCS | Performed by: OBSTETRICS & GYNECOLOGY

## 2024-09-06 RX ORDER — SODIUM CHLORIDE 0.9 % (FLUSH) 0.9 %
10 SYRINGE (ML) INJECTION EVERY 12 HOURS SCHEDULED
Status: DISCONTINUED | OUTPATIENT
Start: 2024-09-06 | End: 2024-09-06 | Stop reason: HOSPADM

## 2024-09-06 RX ORDER — MISOPROSTOL 200 UG/1
800 TABLET ORAL ONCE AS NEEDED
Status: DISCONTINUED | OUTPATIENT
Start: 2024-09-06 | End: 2024-09-06 | Stop reason: HOSPADM

## 2024-09-06 RX ORDER — ACETAMINOPHEN 500 MG
1000 TABLET ORAL ONCE
Status: COMPLETED | OUTPATIENT
Start: 2024-09-06 | End: 2024-09-06

## 2024-09-06 RX ORDER — FENTANYL CITRATE 50 UG/ML
INJECTION, SOLUTION INTRAMUSCULAR; INTRAVENOUS AS NEEDED
Status: DISCONTINUED | OUTPATIENT
Start: 2024-09-06 | End: 2024-09-06 | Stop reason: SURG

## 2024-09-06 RX ORDER — SODIUM CHLORIDE 0.9 % (FLUSH) 0.9 %
10 SYRINGE (ML) INJECTION AS NEEDED
Status: CANCELLED | OUTPATIENT
Start: 2024-09-06

## 2024-09-06 RX ORDER — SODIUM CHLORIDE 9 MG/ML
40 INJECTION, SOLUTION INTRAVENOUS AS NEEDED
Status: CANCELLED | OUTPATIENT
Start: 2024-09-06

## 2024-09-06 RX ORDER — BUPIVACAINE HYDROCHLORIDE 7.5 MG/ML
INJECTION, SOLUTION INTRASPINAL AS NEEDED
Status: DISCONTINUED | OUTPATIENT
Start: 2024-09-06 | End: 2024-09-06 | Stop reason: SURG

## 2024-09-06 RX ORDER — CITRIC ACID/SODIUM CITRATE 334-500MG
30 SOLUTION, ORAL ORAL ONCE
Status: COMPLETED | OUTPATIENT
Start: 2024-09-06 | End: 2024-09-06

## 2024-09-06 RX ORDER — BUPIVACAINE HCL/0.9 % NACL/PF 0.1 %
2 PLASTIC BAG, INJECTION (ML) EPIDURAL ONCE
Status: CANCELLED | OUTPATIENT
Start: 2024-09-06 | End: 2024-09-06

## 2024-09-06 RX ORDER — MORPHINE SULFATE 0.5 MG/ML
INJECTION, SOLUTION EPIDURAL; INTRATHECAL; INTRAVENOUS AS NEEDED
Status: DISCONTINUED | OUTPATIENT
Start: 2024-09-06 | End: 2024-09-06 | Stop reason: SURG

## 2024-09-06 RX ORDER — LIDOCAINE HYDROCHLORIDE 10 MG/ML
0.5 INJECTION, SOLUTION EPIDURAL; INFILTRATION; INTRACAUDAL; PERINEURAL ONCE AS NEEDED
Status: CANCELLED | OUTPATIENT
Start: 2024-09-06

## 2024-09-06 RX ORDER — SODIUM CHLORIDE, SODIUM LACTATE, POTASSIUM CHLORIDE, CALCIUM CHLORIDE 600; 310; 30; 20 MG/100ML; MG/100ML; MG/100ML; MG/100ML
125 INJECTION, SOLUTION INTRAVENOUS CONTINUOUS
Status: DISCONTINUED | OUTPATIENT
Start: 2024-09-06 | End: 2024-09-08 | Stop reason: HOSPADM

## 2024-09-06 RX ORDER — CITRIC ACID/SODIUM CITRATE 334-500MG
30 SOLUTION, ORAL ORAL ONCE
Status: CANCELLED | OUTPATIENT
Start: 2024-09-06 | End: 2024-09-06

## 2024-09-06 RX ORDER — KETOROLAC TROMETHAMINE 30 MG/ML
30 INJECTION, SOLUTION INTRAMUSCULAR; INTRAVENOUS ONCE
Status: CANCELLED | OUTPATIENT
Start: 2024-09-06 | End: 2024-09-06

## 2024-09-06 RX ORDER — OXYTOCIN 10 [USP'U]/ML
INJECTION, SOLUTION INTRAMUSCULAR; INTRAVENOUS AS NEEDED
Status: DISCONTINUED | OUTPATIENT
Start: 2024-09-06 | End: 2024-09-06 | Stop reason: SURG

## 2024-09-06 RX ORDER — ONDANSETRON 2 MG/ML
INJECTION INTRAMUSCULAR; INTRAVENOUS AS NEEDED
Status: DISCONTINUED | OUTPATIENT
Start: 2024-09-06 | End: 2024-09-06 | Stop reason: SURG

## 2024-09-06 RX ORDER — LIDOCAINE HYDROCHLORIDE 10 MG/ML
0.5 INJECTION, SOLUTION EPIDURAL; INFILTRATION; INTRACAUDAL; PERINEURAL ONCE AS NEEDED
Status: DISCONTINUED | OUTPATIENT
Start: 2024-09-06 | End: 2024-09-06 | Stop reason: HOSPADM

## 2024-09-06 RX ORDER — METOCLOPRAMIDE HYDROCHLORIDE 5 MG/ML
INJECTION INTRAMUSCULAR; INTRAVENOUS AS NEEDED
Status: DISCONTINUED | OUTPATIENT
Start: 2024-09-06 | End: 2024-09-06 | Stop reason: SURG

## 2024-09-06 RX ORDER — OXYTOCIN/0.9 % SODIUM CHLORIDE 30/500 ML
PLASTIC BAG, INJECTION (ML) INTRAVENOUS AS NEEDED
Status: DISCONTINUED | OUTPATIENT
Start: 2024-09-06 | End: 2024-09-06 | Stop reason: SURG

## 2024-09-06 RX ORDER — ACETAMINOPHEN 500 MG
1000 TABLET ORAL ONCE
Status: CANCELLED | OUTPATIENT
Start: 2024-09-06 | End: 2024-09-06

## 2024-09-06 RX ORDER — KETOROLAC TROMETHAMINE 30 MG/ML
30 INJECTION, SOLUTION INTRAMUSCULAR; INTRAVENOUS ONCE
Status: COMPLETED | OUTPATIENT
Start: 2024-09-06 | End: 2024-09-06

## 2024-09-06 RX ORDER — OXYTOCIN/0.9 % SODIUM CHLORIDE 30/500 ML
85 PLASTIC BAG, INJECTION (ML) INTRAVENOUS ONCE
Status: CANCELLED | OUTPATIENT
Start: 2024-09-06 | End: 2024-09-06

## 2024-09-06 RX ORDER — SODIUM CHLORIDE, SODIUM LACTATE, POTASSIUM CHLORIDE, CALCIUM CHLORIDE 600; 310; 30; 20 MG/100ML; MG/100ML; MG/100ML; MG/100ML
125 INJECTION, SOLUTION INTRAVENOUS CONTINUOUS
Status: CANCELLED | OUTPATIENT
Start: 2024-09-06

## 2024-09-06 RX ORDER — METHYLERGONOVINE MALEATE 0.2 MG/ML
200 INJECTION INTRAVENOUS ONCE AS NEEDED
Status: CANCELLED | OUTPATIENT
Start: 2024-09-06

## 2024-09-06 RX ORDER — SODIUM CHLORIDE 0.9 % (FLUSH) 0.9 %
10 SYRINGE (ML) INJECTION AS NEEDED
Status: DISCONTINUED | OUTPATIENT
Start: 2024-09-06 | End: 2024-09-06 | Stop reason: HOSPADM

## 2024-09-06 RX ORDER — CARBOPROST TROMETHAMINE 250 UG/ML
250 INJECTION, SOLUTION INTRAMUSCULAR
Status: CANCELLED | OUTPATIENT
Start: 2024-09-06

## 2024-09-06 RX ORDER — MIDAZOLAM HYDROCHLORIDE 1 MG/ML
INJECTION INTRAMUSCULAR; INTRAVENOUS AS NEEDED
Status: DISCONTINUED | OUTPATIENT
Start: 2024-09-06 | End: 2024-09-06 | Stop reason: SURG

## 2024-09-06 RX ORDER — MISOPROSTOL 200 UG/1
800 TABLET ORAL ONCE AS NEEDED
Status: CANCELLED | OUTPATIENT
Start: 2024-09-06

## 2024-09-06 RX ORDER — FAMOTIDINE 10 MG/ML
INJECTION, SOLUTION INTRAVENOUS AS NEEDED
Status: DISCONTINUED | OUTPATIENT
Start: 2024-09-06 | End: 2024-09-06 | Stop reason: SURG

## 2024-09-06 RX ORDER — SODIUM CHLORIDE 9 MG/ML
40 INJECTION, SOLUTION INTRAVENOUS AS NEEDED
Status: DISCONTINUED | OUTPATIENT
Start: 2024-09-06 | End: 2024-09-06 | Stop reason: HOSPADM

## 2024-09-06 RX ORDER — OXYTOCIN/0.9 % SODIUM CHLORIDE 30/500 ML
650 PLASTIC BAG, INJECTION (ML) INTRAVENOUS ONCE
Status: CANCELLED | OUTPATIENT
Start: 2024-09-06 | End: 2024-09-06

## 2024-09-06 RX ORDER — SODIUM CHLORIDE, SODIUM LACTATE, POTASSIUM CHLORIDE, CALCIUM CHLORIDE 600; 310; 30; 20 MG/100ML; MG/100ML; MG/100ML; MG/100ML
INJECTION, SOLUTION INTRAVENOUS CONTINUOUS PRN
Status: DISCONTINUED | OUTPATIENT
Start: 2024-09-06 | End: 2024-09-06 | Stop reason: SURG

## 2024-09-06 RX ORDER — SODIUM CHLORIDE 0.9 % (FLUSH) 0.9 %
10 SYRINGE (ML) INJECTION EVERY 12 HOURS SCHEDULED
Status: CANCELLED | OUTPATIENT
Start: 2024-09-06

## 2024-09-06 RX ADMIN — SODIUM CHLORIDE, POTASSIUM CHLORIDE, SODIUM LACTATE AND CALCIUM CHLORIDE 125 ML/HR: 600; 310; 30; 20 INJECTION, SOLUTION INTRAVENOUS at 23:28

## 2024-09-06 RX ADMIN — MIDAZOLAM HYDROCHLORIDE 1 MG: 1 INJECTION, SOLUTION INTRAMUSCULAR; INTRAVENOUS at 19:57

## 2024-09-06 RX ADMIN — SODIUM CITRATE AND CITRIC ACID MONOHYDRATE 30 ML: 500; 334 SOLUTION ORAL at 19:48

## 2024-09-06 RX ADMIN — SODIUM CHLORIDE, POTASSIUM CHLORIDE, SODIUM LACTATE AND CALCIUM CHLORIDE: 600; 310; 30; 20 INJECTION, SOLUTION INTRAVENOUS at 19:55

## 2024-09-06 RX ADMIN — KETOROLAC TROMETHAMINE 30 MG: 30 INJECTION, SOLUTION INTRAMUSCULAR; INTRAVENOUS at 22:33

## 2024-09-06 RX ADMIN — SODIUM CHLORIDE, POTASSIUM CHLORIDE, SODIUM LACTATE AND CALCIUM CHLORIDE 125 ML/HR: 600; 310; 30; 20 INJECTION, SOLUTION INTRAVENOUS at 16:00

## 2024-09-06 RX ADMIN — FENTANYL CITRATE 20 MCG: 50 INJECTION, SOLUTION INTRAMUSCULAR; INTRAVENOUS at 20:02

## 2024-09-06 RX ADMIN — ONDANSETRON 4 MG: 2 INJECTION INTRAMUSCULAR; INTRAVENOUS at 19:57

## 2024-09-06 RX ADMIN — ACETAMINOPHEN 1000 MG: 500 TABLET ORAL at 15:53

## 2024-09-06 RX ADMIN — SODIUM CHLORIDE 2 G: 900 INJECTION INTRAVENOUS at 19:45

## 2024-09-06 RX ADMIN — FAMOTIDINE 20 MG: 10 INJECTION, SOLUTION INTRAVENOUS at 17:47

## 2024-09-06 RX ADMIN — Medication 500 ML: at 20:24

## 2024-09-06 RX ADMIN — BUPIVACAINE HYDROCHLORIDE IN DEXTROSE 1.5 ML: 7.5 INJECTION, SOLUTION SUBARACHNOID at 20:02

## 2024-09-06 RX ADMIN — MORPHINE SULFATE 0.15 MG: 0.5 INJECTION, SOLUTION EPIDURAL; INTRATHECAL; INTRAVENOUS at 20:02

## 2024-09-06 RX ADMIN — METOCLOPRAMIDE 10 MG: 5 INJECTION, SOLUTION INTRAMUSCULAR; INTRAVENOUS at 17:47

## 2024-09-06 RX ADMIN — OXYTOCIN 10 UNITS: 10 INJECTION INTRAVENOUS at 20:24

## 2024-09-06 NOTE — PROGRESS NOTES
OB FOLLOW UP  CC- Here for care of pregnancy        Seema Barr is a 29 y.o.  39w0d patient being seen today for her obstetrical follow up visit. Patient reports vaginal pressure and back pain. Patient also reports headaches off and on over the past and does not take tylenol for them. Patient also reports swelling and on and off epigastric pain. Patient does report that she hasn't felt baby move as often as she usually does and has been worried over the past week or so about his movements.     Her prenatal care is complicated by (and status) : see below.  Patient Active Problem List   Diagnosis    22 weeks gestation of pregnancy    Echogenic focus of heart of fetus affecting antepartum care of mother    Pregnancy    Encounter for surveillance of contraceptive pills    Women's annual routine gynecological examination    Dysmenorrhea    Female dyspareunia    Anxiety and depression    Encounter for repeat Papanicolaou smear of cervix    Pap smear abnormality of cervix with ASCUS favoring benign    Other fatigue    Pelvic pain    Rh negative, antepartum    History of     Maternal anemia in pregnancy, antepartum       GBS Status:   Group B Strep Culture   Date Value Ref Range Status   2024 No Group B Streptococcus Isolated at 2 days  Final         Allergies   Allergen Reactions    Amoxicillin Hives    Penicillins Hives     Childhood allergy        Her Delivery Plan is: Repeat . Scheduled    US today: no  Non Stress Test: No.    ROS -   Patient Denies: Loss of Fluid, Vaginal Spotting, Vision Changes, Nausea , Vomiting , Epigastric pain, and skin itching  Fetal Movement : decreased  All other systems reviewed and are negative.       The additional following portions of the patient's history were reviewed and updated as appropriate: allergies and current medications.    I have reviewed and agree with the HPI, ROS, and historical information as entered above. Parisa Law,  MD        EXAM:     Prenatal Vitals  BP: 138/100  Weight: 79.4 kg (175 lb)   Fetal Heart Rate: 140              Urine Glucose Read-only: Negative  Urine Protein Read-only: Negative           Assessment and Plan    Problem List Items Addressed This Visit          Gravid and     Pregnancy - Primary    Relevant Orders    POC Urinalysis Dipstick (Completed)       Pregnancy at 39w0d  Fetal status reassuring.   Reviewed Pre-eclampsia signs/symptoms  Decreased movement and increased BP ,some contractions and we will del by R Cection   Delivery options reviewed with patient  Signs of labor reviewed  Kick counts reviewed  Activity and Exercise discussed.  No follow-ups on file.    Parisa Law MD  2024

## 2024-09-06 NOTE — PROGRESS NOTES
Patient has hypertension today decreased fetal movement and contractions.  We will proceed with repeat  today.  She will be sent to labor iyer now.

## 2024-09-06 NOTE — ANESTHESIA PREPROCEDURE EVALUATION
Anesthesia Evaluation     Patient summary reviewed and Nursing notes reviewed   NPO Solid Status: > 6 hours  NPO Liquid Status: > 2 hours           Airway   Mallampati: I  TM distance: >3 FB  Neck ROM: full  No difficulty expected  Dental      Pulmonary - negative pulmonary ROS    breath sounds clear to auscultation  Cardiovascular - negative cardio ROS    Rhythm: regular  Rate: normal        Neuro/Psych  (+) psychiatric history Anxiety and Depression  GI/Hepatic/Renal/Endo - negative ROS     Musculoskeletal (-) negative ROS    Abdominal    Substance History - negative use     OB/GYN    (+) Pregnant, pregnancy induced hypertension        Other                      Anesthesia Plan    ASA 2     ITN and spinal       Anesthetic plan, risks, benefits, and alternatives have been provided, discussed and informed consent has been obtained with: patient.    Use of blood products discussed with patient .        CODE STATUS:    Level Of Support Discussed With: Patient  Code Status (Patient has no pulse and is not breathing): CPR (Attempt to Resuscitate)  Medical Interventions (Patient has pulse or is breathing): Full Support

## 2024-09-06 NOTE — H&P
LES Montoya  Obstetric History and Physical    No chief complaint on file.      Subjective     Patient is a 29 y.o. female  currently at 39w0d, who presents with contractions, hypertension, decreased fetal movement at 39 weeks gestation.  Patient is scheduled to have  in 5 days but we are going to proceed now and will admit her to the hospital for the repeat  at this time.  Patient and  understand and want to proceed..    Her prenatal care is benign.  Her previous obstetric/gynecological history is noted for is non-contributory.    The following portions of the patients history were reviewed and updated as appropriate: current medications .       Prenatal Information:  Prenatal Results       Initial Prenatal Labs       Test Value Reference Range Date Time    Hemoglobin  11.7 g/dL 12.0 - 15.9 24 1406       6.7 g/dL 11.1 - 15.9 24 1445    Hematocrit  35.7 % 34.0 - 46.6 24 1406       25.7 % 34.0 - 46.6 24 1445    Platelets  289 10*3/mm3 140 - 450 24 1406       541 x10E3/uL 150 - 450 24 1445    Rubella IgG  3.19 index Immune >0.99 24 1445    Hepatitis B SAg  Negative  Negative 24 1445    Hepatitis C Ab  Non Reactive  Non Reactive 24 1445    RPR  Non Reactive  Non Reactive 24 1159       Non Reactive  Non Reactive 24 1445    T. Pallidum Ab         ABO  O   24 1445    Rh  Negative   24 1445    Antibody Screen  Negative  Negative 24 1445    HIV  Non Reactive  Non Reactive 24 1445    Urine Culture  Final report   24 1445    Gonorrhea  Negative  Negative 24 1445    Chlamydia  Negative  Negative 24 1445    TSH  2.330 uIU/mL 0.450 - 4.500 24 1435    HgB A1c         Varicella IgG        Hemoglobinopathy Fractionation        Hemoglobinopathy (genetic testing)        Cystic fibrosis                   Fetal testing        Test Value Reference Range Date Time    NIPT        MSAFP         AFP-4                  2nd and 3rd Trimester       Test Value Reference Range Date Time    Hemoglobin (repeated)  12.7 g/dL 12.0 - 15.9 06/27/24 1159    Hematocrit (repeated)  36.6 % 34.0 - 46.6 06/27/24 1159    Platelets   270 10*3/mm3 140 - 450 06/27/24 1159       289 10*3/mm3 140 - 450 03/20/24 1406       541 x10E3/uL 150 - 450 02/06/24 1445    1 hour GTT   64 mg/dL 65 - 139 06/27/24 1159    Antibody Screen (repeated)  Negative  Negative 06/27/24 1159    3rd TM syphilis scrn (repeated)  RPR   Non Reactive  Non Reactive 06/27/24 1159    3rd TM syphilis scrn (repeated) TP-Ab        3rd TM syphilis screen TB-Ab (FTA)        Syphilis cascade test TP-Ab (EIA)        Syphilis cascade TPPA        GTT Fasting        GTT 1 Hr        GTT 2 Hr        GTT 3 Hr        Group B Strep  No Group B Streptococcus Isolated at 2 days   08/16/24 2054              Other testing        Test Value Reference Range Date Time    Parvo IgG         CMV IgG                   Drug Screening       Test Value Reference Range Date Time    Amphetamine Screen  Negative ng/mL Bfmhhb=3199 02/06/24 1445    Barbiturate Screen  Negative ng/mL Ekanyh=781 02/06/24 1445    Benzodiazepine Screen  Negative ng/mL Ukriur=653 02/06/24 1445    Methadone Screen  Negative ng/mL Mdpmfr=928 02/06/24 1445    Phencyclidine Screen  Negative ng/mL Cutoff=25 02/06/24 1445    Opiates Screen  Negative ng/mL Lcgtou=890 02/06/24 1445    THC Screen  Negative ng/mL Cutoff=20 02/06/24 1445    Cocaine Screen  Negative ng/mL Qxzyte=411 02/06/24 1445    Propoxyphene Screen  Negative ng/mL Djqdmn=369 02/06/24 1445    Buprenorphine Screen        Methamphetamine Screen        Oxycodone Screen        Tricyclic Antidepressants Screen                  Legend    ^: Historical                          External Prenatal Results       Pregnancy Outside Results - Transcribed From Office Records - See Scanned Records For Details       Test Value Date Time    ABO  O  02/06/24 1445    Rh   Negative  24 1445    Antibody Screen  Negative  24 1159       Negative  24 1445    Varicella IgG       Rubella  3.19 index 24 1445    Hgb  12.7 g/dL 24 1159       11.7 g/dL 24 1406       6.7 g/dL 24 1445    Hct  36.6 % 24 1159       35.7 % 24 1406       25.7 % 24 1445    HgB A1c        1h GTT  64 mg/dL 24 1159    3h GTT Fasting       3h GTT 1 hour       3h GTT 2 hour       3h GTT 3 hour        Gonorrhea (discrete)  Negative  24 1445    Chlamydia (discrete)  Negative  24 1445    RPR  Non Reactive  24 1159       Non Reactive  24 1445    Syphils cascade: TP-Ab (FTA)       TP-Ab       TP-Ab (EIA)       TPPA       HBsAg  Negative  24 1445    Herpes Simplex Virus PCR       Herpes Simplex VIrus Culture       HIV  Non Reactive  24 1445    Hep C RNA Quant PCR       Hep C Antibody  Non Reactive  24 1445    AFP       NIPT       Cystic Fibroisis        Group B Strep  No Group B Streptococcus Isolated at 2 days  24 205    GBS Susceptibility to Clindamycin       GBS Susceptibility to Erythromycin       Fetal Fibronectin       Genetic Testing, Maternal Blood                 Drug Screening       Test Value Date Time    Urine Drug Screen       Amphetamine Screen  Negative ng/mL 24 1445    Barbiturate Screen  Negative ng/mL 24 1445    Benzodiazepine Screen  Negative ng/mL 24 1445    Methadone Screen  Negative ng/mL 24 1445    Phencyclidine Screen  Negative ng/mL 24 1445    Opiates Screen       THC Screen       Cocaine Screen       Propoxyphene Screen  Negative ng/mL 24 1445    Buprenorphine Screen       Methamphetamine Screen       Oxycodone Screen       Tricyclic Antidepressants Screen                 Legend    ^: Historical                             Past OB History:     OB History    Para Term  AB Living   2 1 1 0 0 1   SAB IAB Ectopic Molar Multiple Live Births   0 0  0 0 0 1      # Outcome Date GA Lbr Anderson/2nd Weight Sex Type Anes PTL Lv   2 Current            1 Term 19 40w1d  3570 g (7 lb 13.9 oz) F CS-LTranv EPI  KADI      Complications: Fetal Intolerance      Name: PRATIK ADAMS      Apgar1: 5  Apgar5: 8       Past Medical History: Past Medical History:   Diagnosis Date    Anemia     Anxiety     Depression     Dysmenorrhea     Menorrhagia       Past Surgical History Past Surgical History:   Procedure Laterality Date     SECTION N/A 2019    Procedure:  SECTION PRIMARY;  Surgeon: Apollo Grant MD;  Location: [facility] LABOR DELIVERY;  Service: Obstetrics/Gynecology      Family History: Family History   Problem Relation Age of Onset    Diabetes Father     Hypertension Father     Diabetes Brother     Diabetes Other     Hypertension Other     Lung cancer Other     Breast cancer Neg Hx     Colon cancer Neg Hx     Ovarian cancer Neg Hx     Uterine cancer Neg Hx       Social History:  reports that she has never smoked. She has never used smokeless tobacco.   reports no history of alcohol use.   reports no history of drug use.        General ROS: Pertinent items are noted in HPI    Objective       Vital Signs Range for the last 24 hours  Temperature:     Temp Source:     BP: BP: (138)/(100) 138/100   Pulse:     Respirations:     SPO2:     O2 Amount (l/min):     O2 Devices     Weight: Weight:  [79.4 kg (175 lb)] 79.4 kg (175 lb)     Physical Examination: General appearance - alert, well appearing, and in no distress    Presentation: Vertex   Cervix: Exam by:     Dilation:     Effacement:     Station:         Fetal Heart Rate Assessment   Method:     Beats/min:     Baseline:     Varibility:     Accels:     Decels:     Tracing Category:       Uterine Assessment   Method:     Frequency (min):     Ctx Count in 10 min:     Duration:     Intensity:     Intensity by IUPC:     Resting Tone:     Resting Tone by IUPC:     [place] Units:       Laboratory  "Results:   Radiology Review:   Other Studies:     Assessment & Plan       Pregnancy-induced hypertension, third trimester    Decreased fetal movements in third trimester    Patient declines vaginal birth after  section ()    39 weeks gestation of pregnancy    History of         Assessment:  1.  Intrauterine pregnancy at 39w0d weeks gestation with reactive fetal status.    2.  Decreased fetal movement.  History of prior .  Declines .  Pregnancy-induced hypertension.  3.  Obstetrical history significant for decreased fetal movement and hypertension..  4.  GBS status: No results found for: \"GBSANTIGEN\"    Plan:  1.  Repeat  today.  2. Plan of care has been reviewed with patient and family  3.  Risks, benefits of treatment plan have been discussed.  4.  All questions have been answered.  5.        Parisa Law MD  2024  14:54 EDT  "

## 2024-09-07 PROBLEM — Z34.90 CURRENTLY PREGNANT: Status: ACTIVE | Noted: 2024-09-07

## 2024-09-07 LAB
ABO GROUP BLD: NORMAL
BASOPHILS # BLD AUTO: 0.02 10*3/MM3 (ref 0–0.2)
BASOPHILS NFR BLD AUTO: 0.2 % (ref 0–1.5)
DEPRECATED RDW RBC AUTO: 41.6 FL (ref 37–54)
EOSINOPHIL # BLD AUTO: 0.03 10*3/MM3 (ref 0–0.4)
EOSINOPHIL NFR BLD AUTO: 0.2 % (ref 0.3–6.2)
ERYTHROCYTE [DISTWIDTH] IN BLOOD BY AUTOMATED COUNT: 11.8 % (ref 12.3–15.4)
FETAL BLEED: NEGATIVE
HCT VFR BLD AUTO: 36.2 % (ref 34–46.6)
HGB BLD-MCNC: 12.4 G/DL (ref 12–15.9)
IMM GRANULOCYTES # BLD AUTO: 0.08 10*3/MM3 (ref 0–0.05)
IMM GRANULOCYTES NFR BLD AUTO: 0.6 % (ref 0–0.5)
LYMPHOCYTES # BLD AUTO: 1.55 10*3/MM3 (ref 0.7–3.1)
LYMPHOCYTES NFR BLD AUTO: 11.7 % (ref 19.6–45.3)
MCH RBC QN AUTO: 33.2 PG (ref 26.6–33)
MCHC RBC AUTO-ENTMCNC: 34.3 G/DL (ref 31.5–35.7)
MCV RBC AUTO: 96.8 FL (ref 79–97)
MONOCYTES # BLD AUTO: 1.05 10*3/MM3 (ref 0.1–0.9)
MONOCYTES NFR BLD AUTO: 7.9 % (ref 5–12)
NEUTROPHILS NFR BLD AUTO: 10.53 10*3/MM3 (ref 1.7–7)
NEUTROPHILS NFR BLD AUTO: 79.4 % (ref 42.7–76)
NRBC BLD AUTO-RTO: 0 /100 WBC (ref 0–0.2)
NUMBER OF DOSES: NORMAL
PLATELET # BLD AUTO: 187 10*3/MM3 (ref 140–450)
PMV BLD AUTO: 11.9 FL (ref 6–12)
RBC # BLD AUTO: 3.74 10*6/MM3 (ref 3.77–5.28)
RH BLD: NEGATIVE
TREPONEMA PALLIDUM IGG+IGM AB [PRESENCE] IN SERUM OR PLASMA BY IMMUNOASSAY: NORMAL
WBC NRBC COR # BLD AUTO: 13.26 10*3/MM3 (ref 3.4–10.8)

## 2024-09-07 PROCEDURE — 86901 BLOOD TYPING SEROLOGIC RH(D): CPT | Performed by: OBSTETRICS & GYNECOLOGY

## 2024-09-07 PROCEDURE — 85461 HEMOGLOBIN FETAL: CPT | Performed by: OBSTETRICS & GYNECOLOGY

## 2024-09-07 PROCEDURE — 25010000002 KETOROLAC TROMETHAMINE PER 15 MG: Performed by: OBSTETRICS & GYNECOLOGY

## 2024-09-07 PROCEDURE — 85025 COMPLETE CBC W/AUTO DIFF WBC: CPT | Performed by: OBSTETRICS & GYNECOLOGY

## 2024-09-07 PROCEDURE — 86900 BLOOD TYPING SEROLOGIC ABO: CPT | Performed by: OBSTETRICS & GYNECOLOGY

## 2024-09-07 PROCEDURE — 25010000002 RHO D IMMUNE GLOBULIN 1500 UNIT/2ML SOLUTION PREFILLED SYRINGE: Performed by: OBSTETRICS & GYNECOLOGY

## 2024-09-07 RX ORDER — HYDROCORTISONE 25 MG/G
1 CREAM TOPICAL AS NEEDED
Status: DISCONTINUED | OUTPATIENT
Start: 2024-09-07 | End: 2024-09-08 | Stop reason: HOSPADM

## 2024-09-07 RX ORDER — OXYCODONE HYDROCHLORIDE 5 MG/1
5 TABLET ORAL EVERY 4 HOURS PRN
Status: DISCONTINUED | OUTPATIENT
Start: 2024-09-07 | End: 2024-09-08 | Stop reason: HOSPADM

## 2024-09-07 RX ORDER — OXYTOCIN/0.9 % SODIUM CHLORIDE 30/500 ML
85 PLASTIC BAG, INJECTION (ML) INTRAVENOUS ONCE
Status: DISCONTINUED | OUTPATIENT
Start: 2024-09-07 | End: 2024-09-08 | Stop reason: HOSPADM

## 2024-09-07 RX ORDER — MISOPROSTOL 200 UG/1
600 TABLET ORAL AS NEEDED
Status: DISCONTINUED | OUTPATIENT
Start: 2024-09-07 | End: 2024-09-08 | Stop reason: HOSPADM

## 2024-09-07 RX ORDER — OXYCODONE HYDROCHLORIDE 10 MG/1
10 TABLET ORAL EVERY 4 HOURS PRN
Status: DISCONTINUED | OUTPATIENT
Start: 2024-09-07 | End: 2024-09-08 | Stop reason: HOSPADM

## 2024-09-07 RX ORDER — KETOROLAC TROMETHAMINE 15 MG/ML
15 INJECTION, SOLUTION INTRAMUSCULAR; INTRAVENOUS EVERY 6 HOURS
Status: COMPLETED | OUTPATIENT
Start: 2024-09-07 | End: 2024-09-08

## 2024-09-07 RX ORDER — OXYTOCIN/0.9 % SODIUM CHLORIDE 30/500 ML
125 PLASTIC BAG, INJECTION (ML) INTRAVENOUS ONCE AS NEEDED
Status: DISCONTINUED | OUTPATIENT
Start: 2024-09-07 | End: 2024-09-08 | Stop reason: HOSPADM

## 2024-09-07 RX ORDER — DOCUSATE SODIUM 100 MG/1
100 CAPSULE, LIQUID FILLED ORAL 2 TIMES DAILY PRN
Status: DISCONTINUED | OUTPATIENT
Start: 2024-09-07 | End: 2024-09-08 | Stop reason: HOSPADM

## 2024-09-07 RX ORDER — ALUMINA, MAGNESIA, AND SIMETHICONE 2400; 2400; 240 MG/30ML; MG/30ML; MG/30ML
15 SUSPENSION ORAL EVERY 4 HOURS PRN
Status: DISCONTINUED | OUTPATIENT
Start: 2024-09-07 | End: 2024-09-08 | Stop reason: HOSPADM

## 2024-09-07 RX ORDER — METHYLERGONOVINE MALEATE 0.2 MG/ML
200 INJECTION INTRAVENOUS ONCE AS NEEDED
Status: DISCONTINUED | OUTPATIENT
Start: 2024-09-07 | End: 2024-09-08 | Stop reason: HOSPADM

## 2024-09-07 RX ORDER — SIMETHICONE 80 MG
80 TABLET,CHEWABLE ORAL 4 TIMES DAILY PRN
Status: DISCONTINUED | OUTPATIENT
Start: 2024-09-07 | End: 2024-09-08 | Stop reason: HOSPADM

## 2024-09-07 RX ORDER — OXYTOCIN/0.9 % SODIUM CHLORIDE 30/500 ML
650 PLASTIC BAG, INJECTION (ML) INTRAVENOUS ONCE
Status: DISCONTINUED | OUTPATIENT
Start: 2024-09-07 | End: 2024-09-08 | Stop reason: HOSPADM

## 2024-09-07 RX ORDER — METOCLOPRAMIDE 10 MG/1
10 TABLET ORAL AS NEEDED
Status: DISCONTINUED | OUTPATIENT
Start: 2024-09-07 | End: 2024-09-08 | Stop reason: HOSPADM

## 2024-09-07 RX ORDER — IBUPROFEN 600 MG/1
600 TABLET, FILM COATED ORAL EVERY 6 HOURS
Status: DISCONTINUED | OUTPATIENT
Start: 2024-09-08 | End: 2024-09-08 | Stop reason: HOSPADM

## 2024-09-07 RX ORDER — ACETAMINOPHEN 500 MG
1000 TABLET ORAL EVERY 6 HOURS
Status: COMPLETED | OUTPATIENT
Start: 2024-09-07 | End: 2024-09-07

## 2024-09-07 RX ORDER — HYDROMORPHONE HYDROCHLORIDE 1 MG/ML
0.5 INJECTION, SOLUTION INTRAMUSCULAR; INTRAVENOUS; SUBCUTANEOUS
Status: ACTIVE | OUTPATIENT
Start: 2024-09-07 | End: 2024-09-08

## 2024-09-07 RX ORDER — CARBOPROST TROMETHAMINE 250 UG/ML
250 INJECTION, SOLUTION INTRAMUSCULAR
Status: DISCONTINUED | OUTPATIENT
Start: 2024-09-07 | End: 2024-09-08 | Stop reason: HOSPADM

## 2024-09-07 RX ORDER — CARBOPROST TROMETHAMINE 250 UG/ML
250 INJECTION, SOLUTION INTRAMUSCULAR AS NEEDED
Status: DISCONTINUED | OUTPATIENT
Start: 2024-09-07 | End: 2024-09-08 | Stop reason: HOSPADM

## 2024-09-07 RX ORDER — CALCIUM CARBONATE 500 MG/1
1 TABLET, CHEWABLE ORAL EVERY 4 HOURS PRN
Status: DISCONTINUED | OUTPATIENT
Start: 2024-09-07 | End: 2024-09-08 | Stop reason: HOSPADM

## 2024-09-07 RX ORDER — ACETAMINOPHEN 325 MG/1
650 TABLET ORAL EVERY 6 HOURS
Status: DISCONTINUED | OUTPATIENT
Start: 2024-09-08 | End: 2024-09-08 | Stop reason: HOSPADM

## 2024-09-07 RX ORDER — PRENATAL VIT/IRON FUM/FOLIC AC 27MG-0.8MG
1 TABLET ORAL DAILY
Status: DISCONTINUED | OUTPATIENT
Start: 2024-09-07 | End: 2024-09-08 | Stop reason: HOSPADM

## 2024-09-07 RX ADMIN — KETOROLAC TROMETHAMINE 15 MG: 15 INJECTION, SOLUTION INTRAMUSCULAR; INTRAVENOUS at 13:01

## 2024-09-07 RX ADMIN — PRENATAL VITAMINS-IRON FUMARATE 27 MG IRON-FOLIC ACID 0.8 MG TABLET 1 TABLET: at 10:00

## 2024-09-07 RX ADMIN — DOCUSATE SODIUM 100 MG: 100 CAPSULE, LIQUID FILLED ORAL at 10:00

## 2024-09-07 RX ADMIN — HUMAN RHO(D) IMMUNE GLOBULIN 1500 UNITS: 1500 SOLUTION INTRAMUSCULAR; INTRAVENOUS at 10:22

## 2024-09-07 RX ADMIN — ACETAMINOPHEN 1000 MG: 500 TABLET ORAL at 16:48

## 2024-09-07 RX ADMIN — KETOROLAC TROMETHAMINE 15 MG: 15 INJECTION, SOLUTION INTRAMUSCULAR; INTRAVENOUS at 06:54

## 2024-09-07 RX ADMIN — ACETAMINOPHEN 1000 MG: 500 TABLET ORAL at 10:00

## 2024-09-07 RX ADMIN — ACETAMINOPHEN 1000 MG: 500 TABLET ORAL at 03:51

## 2024-09-07 RX ADMIN — Medication 1 APPLICATION: at 10:56

## 2024-09-07 RX ADMIN — ACETAMINOPHEN 1000 MG: 500 TABLET ORAL at 21:44

## 2024-09-07 RX ADMIN — KETOROLAC TROMETHAMINE 15 MG: 15 INJECTION, SOLUTION INTRAMUSCULAR; INTRAVENOUS at 18:53

## 2024-09-07 NOTE — ANESTHESIA PROCEDURE NOTES
Spinal Block      Patient reassessed immediately prior to procedure    Patient location during procedure: OB  Performed By  Anesthesiologist: Jhon Cooley DO  Preanesthetic Checklist  Completed: patient identified, IV checked, site marked, risks and benefits discussed, surgical consent, monitors and equipment checked, pre-op evaluation and timeout performed  Spinal Block Prep:  Patient Position:sitting  Sterile Tech:cap, gloves, mask and sterile barriers  Prep:Chloraprep  Patient Monitoring:blood pressure monitoring, continuous pulse oximetry and EKG    Spinal Block Procedure  Approach:midline  Guidance:landmark technique and palpation technique  Location:L3-L4  Needle Type:Penny  Needle Gauge:25 G  Placement of Spinal needle event:cerebrospinal fluid aspirated  Paresthesia: no  Fluid Appearance:clear     Post Assessment  Patient Tolerance:patient tolerated the procedure well with no apparent complications  Complications no

## 2024-09-07 NOTE — LACTATION NOTE
24 0100   Maternal Information   Date of Referral 24   Person Making Referral lactation consultant  (courtesy visit, newly postpartum)   Maternal Reason for Referral breastfeeding currently   Infant Reason for Referral  infant   Maternal Assessment   Breast Size Issue none   Breast Shape Bilateral:;round   Breast Density Bilateral:;soft   Areola Bilateral:;elastic   Nipples Bilateral:;flat  (medium shield)   Left Nipple Symptoms intact;nontender   Right Nipple Symptoms intact;nontender   Maternal Infant Feeding   Maternal Emotional State receptive;relaxed   Infant Positioning clutch/football  (left)   Signs of Milk Transfer none noted   Pain with Feeding no   Latch Assistance minimal assistance   Support Person Involvement actively supporting mother   Milk Expression/Equipment   Breast Pump Type double electric, personal  (I took her a S2)   Lactation Referrals   Lactation Referrals outpatient lactation program   Outpatient Lactation Program Lactation Follow-up Date/Time prn after discharge     Courtesy visit to newly postpartum couplet. Mom states she is unsure if she should latch baby or pump. We discussed both and I offered to help with both. She states she would like to latch baby while I am here. Assisted with latch in left football hold, flat nipples, medium nipple shield used. Baby latched great and nursed x 35 min. Mom denies pain. Reviewed breastfeeding tips and information handouts with parents. They verbalized understanding. I took her a S2, sterilization bag, wash basin, soap, oral syringes. Encouraged to call lactation with any questions./concerns. Encouraged to call outpatient lactation prn after discharge.

## 2024-09-07 NOTE — ANESTHESIA POSTPROCEDURE EVALUATION
Patient: Seema Barr    Procedure Summary       Date: 24 Room / Location: [facility] LABOR DELIVERY   [facility] LABOR DELIVERY    Anesthesia Start:  Anesthesia Stop:     Procedure:  SECTION REPEAT (Abdomen) Diagnosis:     Surgeons: Parisa Law MD Provider: Jhon Cooley DO    Anesthesia Type: ITN, spinal ASA Status: 2            Anesthesia Type: ITN, spinal    Vitals  Vitals Value Taken Time   BP 97/74    Temp 97.5 F    Pulse 70 24   Resp 16    SpO2 98 % 24   Vitals shown include unfiled device data.        Post Anesthesia Care and Evaluation    Patient location during evaluation: bedside  Patient participation: complete - patient participated  Level of consciousness: awake  Pain score: 0  Pain management: satisfactory to patient    Airway patency: patent  Anesthetic complications: No anesthetic complications  PONV Status: none  Cardiovascular status: acceptable and hemodynamically stable  Respiratory status: acceptable  Hydration status: acceptable

## 2024-09-07 NOTE — OP NOTE
"Operative Note    Patient name: Seema Barr  YOB: 1995   MRN: [id number]  Admission Date: 2024  Referring Provider: No Known Provider    ID: 29 y.o.  at 39w0d    Pre-Operative Dx:   Intrauterine pregnancy at 39w0d weeks   Previous  section - declines   Pregnancy-induced hypertension      Postoperative dx:    Intrauterine pregnancy at 39w0d weeks   Previous  section - declines               Procedure(s): repeatlow transverse  delivery     Surgeons: Surgeons and Role:     * Parisa Law MD - Primary    Staff:  Surgeon(s):  Parisa Law MD                Anesthesia: Spinal    Estimated Blood Loss:  550  mL    IV Fluids: 1900 mls    Preoperative antibiotic: Ancef (cefazolin) 2 grams    Blood products:   Blood Administration Record (From admission, onward)      None            Pathology:   Order Name Source Comment Collection Info Order Time   CBC (NO DIFF)   Collected By: Christine Reyna RN 2024  3:41 PM     Release to patient   Routine Release        TREPONEMA PALLIDUM AB W/REFLEX RPR   Collected By: Christine Reyna RN 2024  3:41 PM     Release to patient   Routine Release        PRE-ECLAMPSIA PANEL   Collected By: Christine Reyna RN 2024  3:43 PM     Release to patient   Routine Release        TYPE AND SCREEN   Collected By: Christine Reyna RN 2024  3:41 PM     Release to patient   Routine Release            Drains: Watters catheter to gravity    Complications: None    Condition: Stable to recovery room    Infant:                Gender: male  infant    Weight: 3710 g (8 lb 2.9 oz)     Apgars: 7  @ 1 minute /     9  @ 5 minutes    Cord gases: Venous:  No results found for: \"PHCVEN\", \"BECVEN\"      Arterial:  No results found for: \"PHCART\", \"BECART\"          Operative Summary: Patient was counseled about the risks of a  including the possibility of bleeding infection damage to bowel bladder and ureter as well as postop issues " like blood clots hematomas pneumonias.  Patient's had hypertension in the office and we removed her  to now instead of 3 days from now as it was scheduled.   After obtaining informed consent the patient was taken to the operating room where adequate anesthesia was obtained.  Watters catheter was placed in the bladder preoperatively.  IV antibiotics were given preoperatively.       The abdomen was prepped and draped in the usual sterile fashion for  delivery.  After confirming adequate anesthesia a Pfannenstiel skin incision was made with the scalpel and carried through to the underlying layer of fascia.  The fascia was incised in the midline and the incision extended laterally with the Smyth scissors and with blunt dissection.       The upper aspect of the fascia was grasped with 2 Kocher clamps, elevated, and dissected off the underlying rectus muscles bluntly and with the Smyth scissors.  The Kocher clamps were removed and applied to the inferior aspect of the fascia.  The fascia was dissected off of the rectus muscles in the same fashion.  The peritoneum was entered bluntly.  The incision was stretched and the bladder blade and Alexandre retractor inserted for visualization of the uterus. A bladder flap was made and bladder blade replaced.        The uterus was incised with the scalpel in a low transverse fashion.  The uterine incision was entered digitally and the incision extended bluntly in a craniocaudal fashion.  Retractors were removed and membranes were ruptured.  The infant was delivered atraumatically from vertex presentation.  The umbilical cord was milked 4 times, clamped and cut and the nose and mouth bulb suctioned.  The infant was handed off to waiting pediatric staff.       Cord blood gases were not collected.  Cord blood was collected.  The placenta was removed using cord traction and uterine massage.  The uterus was exteriorized and cleared of all clots and debris.  The uterine  incision was repaired with #1 chromic in a running locked fashion. A double layer technique was used.  Additional hemostatic measures required: electrocautery and figure-of-eight sutures.    The incision was inspected and excellent hemostasis was noted.  The tubes and ovaries were noted to be normal.   The uterus was returned to the abdomen.  The gutters were cleared of all clots and debris.  Irrigation was used.  The uterine incision was again inspected and found to be hemostatic.       The peritoneum was reapproximated with 2-0 Vicryl in a running fashion.  The fascia was closed with 0 Vicryl in a running fashion.  The subcutaneous space was reapproximated using 3-0 plain gut in interrupted stiches.      The skin was closed using 3-0 Vicryl subcuticular stitch and Steri-Strips.  And dressing placed. All sharp, instrument, and sponge counts were correct. The patient was transferred to the recovery room in stable condition.    Surgical Assist: USAMA Law MD  9/6/2024

## 2024-09-07 NOTE — PROGRESS NOTES
2024    Name:Seema Barr    MR#:[id number]     PROGRESS NOTE:  Post-Op 1 S/P    HD:1    Subjective   29 y.o. yo Female  s/p CS at 39w0d doing well. Pain well controlled. Tolerating regular diet and having flatus. Lochia normal.  She denies dizziness, headache.      Pregnancy induced hypertension    Pregnancy-induced hypertension, third trimester    Decreased fetal movements in third trimester    Patient declines vaginal birth after  section ()    39 weeks gestation of pregnancy    History of     Currently pregnant       Objective    Vitals  Temp:  Temp:  [97.5 °F (36.4 °C)-98.6 °F (37 °C)] 98.3 °F (36.8 °C)  Temp src: Oral  BP:  BP: ()/() 108/64  Pulse:  Heart Rate:  [] 75  RR:   Resp:  [16-20] 16    General Awake, alert, no distress  Abdomen Soft, non-distended, fundus firm, below umbilicus, appropriately tender  Incision  Intact, no erythema or exudate  Extremities Calves NT bilaterally     I/O last 3 completed shifts:  In: 1100 [I.V.:1100]  Out: 1060 [Urine:700; Blood:360]    LABS:   Lab Results   Component Value Date    WBC 13.26 (H) 2024    HGB 12.4 2024    HCT 36.2 2024    MCV 96.8 2024     2024       Infant: male       Assessment   1.  POD 1, doing well   2.  GHTN at term--- BP wnl no on no meds; asymptomatic; PEP wnl this am   3.  Rh negative; baby Rh positive--- Rhogam ordered   4.  Baby boy well; desires circ    Plan:  Routine postoperative care.  Advance.      Active Problems:   None      Abbie Miller, APRN  2024 10:17 EDT

## 2024-09-08 VITALS
TEMPERATURE: 97.8 F | BODY MASS INDEX: 29.88 KG/M2 | RESPIRATION RATE: 16 BRPM | HEART RATE: 67 BPM | OXYGEN SATURATION: 94 % | WEIGHT: 175 LBS | DIASTOLIC BLOOD PRESSURE: 70 MMHG | SYSTOLIC BLOOD PRESSURE: 115 MMHG | HEIGHT: 64 IN

## 2024-09-08 PROCEDURE — 25010000002 KETOROLAC TROMETHAMINE PER 15 MG: Performed by: OBSTETRICS & GYNECOLOGY

## 2024-09-08 RX ORDER — IBUPROFEN 600 MG/1
600 TABLET, FILM COATED ORAL EVERY 6 HOURS
Qty: 30 TABLET | Refills: 0 | Status: SHIPPED | OUTPATIENT
Start: 2024-09-08

## 2024-09-08 RX ADMIN — IBUPROFEN 600 MG: 600 TABLET, FILM COATED ORAL at 07:20

## 2024-09-08 RX ADMIN — IBUPROFEN 600 MG: 600 TABLET, FILM COATED ORAL at 13:11

## 2024-09-08 RX ADMIN — ACETAMINOPHEN 650 MG: 325 TABLET ORAL at 05:10

## 2024-09-08 RX ADMIN — DOCUSATE SODIUM 100 MG: 100 CAPSULE, LIQUID FILLED ORAL at 08:36

## 2024-09-08 RX ADMIN — ACETAMINOPHEN 650 MG: 325 TABLET ORAL at 10:23

## 2024-09-08 RX ADMIN — ACETAMINOPHEN 650 MG: 325 TABLET ORAL at 15:25

## 2024-09-08 RX ADMIN — PRENATAL VITAMINS-IRON FUMARATE 27 MG IRON-FOLIC ACID 0.8 MG TABLET 1 TABLET: at 08:36

## 2024-09-08 RX ADMIN — KETOROLAC TROMETHAMINE 15 MG: 15 INJECTION, SOLUTION INTRAMUSCULAR; INTRAVENOUS at 01:23

## 2024-09-08 NOTE — DISCHARGE SUMMARY
Discharge Summary    Date of Admission: 2024  Date of Discharge:  2024      Patient: Seema Barr      MR#:[id number]    Delivery Provider: Parisa Law       Presenting Problem/History of Present Illness  Pregnancy induced hypertension [O13.9]  Currently pregnant [Z34.90]       Pregnancy induced hypertension    Pregnancy-induced hypertension, third trimester    Decreased fetal movements in third trimester    Patient declines vaginal birth after  section ()    39 weeks gestation of pregnancy    History of     Currently pregnant         Procedures:  , Low Transverse     2024    8:22 PM        Hospital Course  Patient is a 29 y.o. female  at 39w0d status post vaginal delivery without complication. Postpartum the patient did well. She remained afebrile, with vital signs stable. She was ready for discharge on postpartum day 2.     Infant:   male  fetus 3710 g (8 lb 2.9 oz)  with Apgar scores of 7 , 9  at five minutes.    Condition on Discharge:  Stable    Vital Signs  Temp:  [97.7 °F (36.5 °C)-98.4 °F (36.9 °C)] 97.8 °F (36.6 °C)  Heart Rate:  [67-87] 67  Resp:  [16] 16  BP: (107-116)/(60-70) 115/70    Lab Results   Component Value Date    WBC 13.26 (H) 2024    HGB 12.4 2024    HCT 36.2 2024    MCV 96.8 2024     2024       Discharge Disposition  Home or Self Care    Discharge Medications     Discharge Medications        New Medications        Instructions Start Date   ibuprofen 600 MG tablet  Commonly known as: ADVIL,MOTRIN   600 mg, Oral, Every 6 Hours             Continue These Medications        Instructions Start Date   ferrous sulfate 325 (65 FE) MG tablet   325 mg, Oral, 2 Times Daily      prenatal (CLASSIC) vitamin 28-0.8 MG tablet tablet  Generic drug: prenatal vitamin   Oral, Daily                   Follow-up Appointments  Future Appointments   Date Time Provider Department Center   2024 10:30 AM PAT 1 [facility]     Additional Instructions for the Follow-ups that You Need to Schedule       Discharge Follow-up with Specified Provider: [facility]; 1 Week   As directed      To: [facility]   Follow Up: 1 Week   Follow Up Details: sandra Miller, NORMA  09/08/24  10:32 EDT  Cs[facility]

## 2024-09-08 NOTE — LACTATION NOTE
24 1135   Maternal Information   Date of Referral 24   Person Making Referral nurse   Maternal Reason for Referral breastfeeding currently   Infant Reason for Referral  infant   Maternal Infant Feeding   Maternal Emotional State receptive   Infant Positioning cradle;cross-cradle   Comfort Measures Before/During Feeding infant position adjusted   Latch Assistance verbal guidance offered     Follow up visit per RN request. MOB currently nursing. Baby coming on and off breast, with shield. Noted infant was putting chin to chest to reach nipple, MOB using syringe of formula to entice latch. Adjusted baby lower so he has full neck extension for latch. He then maintained latch and did not need formula drops to encourage suck. Reviewed latching positioning and answered pump questions. Encouraged to call as needs arise.

## 2024-09-08 NOTE — LACTATION NOTE
Courtesy f/u visit. Mom reports nursing going well. States baby had a 30-min. Feed earlier. She has no questions/concerns at this time. Enc. To call lactation prn.

## 2024-09-09 ENCOUNTER — TELEPHONE (OUTPATIENT)
Dept: OBSTETRICS AND GYNECOLOGY | Facility: CLINIC | Age: 29
End: 2024-09-09
Payer: COMMERCIAL

## 2024-09-09 NOTE — TELEPHONE ENCOUNTER
Pt reports that her BP has been good. She doesn't want to travel to [place] a lot because she has baby appointments too and lives two hours away. Told pt to find out when baby follows up and we will see if we can coordinate appointments. Verbalized understanding.

## 2024-09-09 NOTE — TELEPHONE ENCOUNTER
Caller: Seema Barr    Relationship: Self    Best call back number: [phone]    Who are you requesting to speak with (clinical staff, provider,  specific staff member): CLINICAL     What was the call regarding: PT WOULD LIKE TO DISCUSS WITH A NURSE WHEN SHE NEEDS TO RETURN FOR A FOLLOW UP

## 2024-09-11 ENCOUNTER — TELEPHONE (OUTPATIENT)
Dept: OBSTETRICS AND GYNECOLOGY | Facility: CLINIC | Age: 29
End: 2024-09-11
Payer: COMMERCIAL

## 2024-09-11 NOTE — TELEPHONE ENCOUNTER
Pt had , dr. Law advise she comes in for a BP check and f/.  pt lives 2 hrs away, wanted know can she come in next week

## 2024-09-11 NOTE — TELEPHONE ENCOUNTER
[facility]. Pt can schedule this for early next week. She should be seen sooner if BP is >140/90, she is having headaches that are not improved with rest or medicine, or epigastric pain.

## 2024-09-17 ENCOUNTER — MATERNAL SCREENING (OUTPATIENT)
Dept: CALL CENTER | Facility: HOSPITAL | Age: 29
End: 2024-09-17
Payer: COMMERCIAL

## 2024-09-17 ENCOUNTER — POSTPARTUM VISIT (OUTPATIENT)
Dept: OBSTETRICS AND GYNECOLOGY | Facility: CLINIC | Age: 29
End: 2024-09-17
Payer: COMMERCIAL

## 2024-09-17 VITALS
SYSTOLIC BLOOD PRESSURE: 128 MMHG | WEIGHT: 153 LBS | DIASTOLIC BLOOD PRESSURE: 84 MMHG | BODY MASS INDEX: 26.12 KG/M2 | HEIGHT: 64 IN

## 2024-09-17 DIAGNOSIS — O13.9 PREGNANCY INDUCED HYPERTENSION, ANTEPARTUM: Primary | ICD-10-CM

## 2024-09-17 PROCEDURE — 0503F POSTPARTUM CARE VISIT: CPT

## 2024-10-09 ENCOUNTER — TELEPHONE (OUTPATIENT)
Dept: OBSTETRICS AND GYNECOLOGY | Facility: CLINIC | Age: 29
End: 2024-10-09
Payer: COMMERCIAL

## 2024-10-09 NOTE — TELEPHONE ENCOUNTER
OP PP pt  RC/S on 09/06/24  Next appt (6 wk pp) scheduled for 10/15/24    Patient reports some concerns about her C/S incision. Patient states that she can see a couple of sutures at the right side of her incision. Patient states that it is slightly red and she did notice a small amount of bleeding the other day, but she thought it was from a hair follicle and has not had any since. Patient reports some tenderness at site, but no pain. Patient also reports some lower abdominal pressure and back pain for the past 2 1/2 days. Patient states that she thought it was gas pain and took Gas X with no relief. Patient states that the pain is manageable, just uncomfortable and rates it a 3/10. Patient denies any fever, chills, dysuria, urinary urgency, or frequency. Patient informed that I would speak with a provider and call her back. Pt v/u.    Patient notified that per AM, that the knot of the suture may be working its way out where it has not completely absorbed and that is ok as long as patient is not having any signs or symptoms of infection. Patient instructed to send a picture of incision via [employer]t. Signs & symptoms of infections reviewed with patient & pt v/u..

## 2024-10-09 NOTE — TELEPHONE ENCOUNTER
Pt called and stated that she had a c section 4 weeks ago and she has some questions and concerns about her incision from her c section. Would like a call back to discuss.

## 2024-10-15 ENCOUNTER — POSTPARTUM VISIT (OUTPATIENT)
Dept: OBSTETRICS AND GYNECOLOGY | Facility: CLINIC | Age: 29
End: 2024-10-15
Payer: COMMERCIAL

## 2024-10-15 VITALS — WEIGHT: 151.8 LBS | DIASTOLIC BLOOD PRESSURE: 78 MMHG | SYSTOLIC BLOOD PRESSURE: 106 MMHG | BODY MASS INDEX: 26.06 KG/M2

## 2024-10-15 DIAGNOSIS — Z34.90 PREGNANCY, UNSPECIFIED GESTATIONAL AGE: Primary | ICD-10-CM

## 2024-10-15 NOTE — PROGRESS NOTES
No chief complaint on file.      Postpartum Visit         Seema Barr is a 29 y.o.  who presents today for a 6 week(s) postpartum check.     C/S: repeat     , Low Transverse   Information for the patient's :  Riley Barr [[id number]]   2024   male   Riley Barr   3710 g (8 lb 2.9 oz)   Gestational Age: 39w0d       Baby Discharged: Discharged with Mom  Delivering Physician: Parisa Law MD    Her pregnancy was complicated by gestational HTN. The incision is healing well. Patient describes vaginal bleeding as light.  Patient is breastfeeding.  She desires her options  for contraception.      She would like to discuss the following complaints today: none.    Patient denies concerns for postpartum depression/anxiety. Patient denies  suicidal or homicidal ideation. Her postpartum depression screening questionnaire: 9. No treatment is indicated      Last Pap : 10/6/2022. Results: negative.  Last Completed Pap Smear            PAP SMEAR (Every 3 Years) Order placed this encounter      10/06/2022  LIQUID-BASED PAP SMEAR, [facility] ([facility])    2021  SCANNED - PAP SMEAR    2021  SCANNED - PAP SMEAR    2020  SCANNED - PAP SMEAR    2019  Done - in [place]    Only the first 5 history entries have been loaded, but more history exists.                      The additional following portions of the patient's history were reviewed and updated as appropriate: allergies and current medications.    Review of Systems   Constitutional: Negative.    HENT: Negative.     Eyes: Negative.    Respiratory: Negative.     Cardiovascular: Negative.    Gastrointestinal: Negative.    Endocrine: Negative.    Genitourinary: Negative.    Musculoskeletal: Negative.    Allergic/Immunologic: Negative.    Neurological: Negative.    Hematological: Negative.    Psychiatric/Behavioral: Negative.       All other systems reviewed and are negative.     I have reviewed and  agree with the HPI, ROS, and historical information as entered above. Parisa Law MD      /78   Wt 68.9 kg (151 lb 12.8 oz)   LMP 2023   Breastfeeding Yes   BMI 26.06 kg/m²     Physical Exam  Vitals and nursing note reviewed. Exam conducted with a chaperone present.   Genitourinary:     Labia:         Right: No rash, tenderness or lesion.         Left: No rash, tenderness or lesion.       Vagina: Normal. Bleeding present. No lesions.      Cervix: No cervical motion tenderness, discharge, lesion or cervical bleeding.      Uterus: Normal. Not enlarged, not fixed and not tender.       Adnexa:         Right: No mass or tenderness.          Left: No mass or tenderness.        Rectum: No external hemorrhoid.      Comments: Chaperone Present            Assessment and Plan    Problem List Items Addressed This Visit          Gravid and     Pregnancy - Primary       S/p , 6 week(s) postpartum.  Doing well.    Return to normal physical activity.  No pelvic restrictions.   Breastfeeding going well.  Contraception: contraceptive methods: None  Return in about 1 year (around 10/15/2025).     Parisa aLw MD  10/15/2024

## 2024-10-16 LAB — REF LAB TEST METHOD: NORMAL

## 2024-10-18 ENCOUNTER — TELEPHONE (OUTPATIENT)
Dept: LACTATION | Facility: HOSPITAL | Age: 29
End: 2024-10-18
Payer: COMMERCIAL

## 2024-10-18 NOTE — TELEPHONE ENCOUNTER
"Seema called and left message asking to follow up on some concerns she spoke with  about earlier this week. Per phone log sheet in office, patient had concerns about increased fussiness at the breast; baby is 6wo. He has already gained 2lbs since birth, and is sleeping longer stretches overnight.     Returned call. Seema reports baby is still fussy at breast, sometimes being so \"mad\" that she sometimes has to pump and bottle feed. Reports baby tolerates bottle well. Reports he has not been fussy like this until recently, and has not had any latch issues. He has had some increased gassiness as well. Seema is very anxious about baby's behavior. Also reports a few \"bumps\" on baby's face and behind ears and is concerned it could be reaction to something she is eating, or could be baby acne. Next ped appointment in 2 weeks, encouraged her to ask ped about it.     Offered outpatient clinic appointment for weighted feeding and reassurance. Seema reports she lives more than 2hr away. Encouraged her to find an IBCLC closer to home, but she is welcome to call back and schedule with us if she does not find someone.    Also encouraged her to reach out to her OB about her level of anxiety and reviewed PPD/PPA.   "

## 2025-01-09 ENCOUNTER — TELEPHONE (OUTPATIENT)
Dept: LACTATION | Facility: HOSPITAL | Age: 30
End: 2025-01-09
Payer: COMMERCIAL

## 2025-01-09 NOTE — TELEPHONE ENCOUNTER
Pt called with breastfeeding questions: lives 2hrs away, so Outpatient Lactation Clinic appointment follow up is not feasible.   Four month old fussy with feedings, struggling with start to latch, arching; once latched, he is okay. A week ago starting to refusing bottle feeding, so much of feedings are at night. Feeding patterns seem to be inconsistent.  Pumping and milk production seems to be adequate. Possible recurrent plugged duct on right breast (pt reports not severe/mastitis); discussed feeding position changes, gentle massage, ice, sunflower lecithin.   Since feeding difficulties seem to be infant-driven, encouraged feeding follow up with pediatric SLP/OT.   All questions answered at this time, [facility] Outpatient Lactation Clinic contact encouraged.

## (undated) DEVICE — SUT VIC 2/0 CT1 27IN J339H BX/36

## (undated) DEVICE — SOL IRR NACL 0.9PCT BT 1000ML

## (undated) DEVICE — PENCL SMOKE/EVAC MEGADYNE TELESCP 10FT

## (undated) DEVICE — SUT PLAIN  3/0 CT1 27IN 842H

## (undated) DEVICE — 4-PORT MANIFOLD: Brand: NEPTUNE 2

## (undated) DEVICE — PK C/SECT 10

## (undated) DEVICE — APPL CHLORAPREP TINTED 26ML TEAL

## (undated) DEVICE — ADHS LIQ MASTISOL 2/3ML

## (undated) DEVICE — SOL IRR H2O BTL 1000ML STRL

## (undated) DEVICE — GLV SURG BIOGEL LTX PF 7 1/2

## (undated) DEVICE — MAT PREVALON MOBL TRANSFR AIR W/PAD REPROC 39X81IN

## (undated) DEVICE — TRAP FLD MINIVAC MEGADYNE 100ML

## (undated) DEVICE — SUT VIC 3/0 PS2 27IN J427H

## (undated) DEVICE — PROXIMATE RH ROTATING HEAD SKIN STAPLERS (35 WIDE) CONTAINS 35 STAINLESS STEEL STAPLES: Brand: PROXIMATE

## (undated) DEVICE — TRY SPINE BLCK WHITACRE 25G 3X5IN

## (undated) DEVICE — PAD GRND REM POLYHESIVE A/ DISP

## (undated) DEVICE — PATIENT RETURN ELECTRODE, SINGLE-USE, CONTACT QUALITY MONITORING, ADULT, WITH 9FT CORD, FOR PATIENTS WEIGING OVER 33LBS. (15KG): Brand: MEGADYNE

## (undated) DEVICE — GLV SURG SENSICARE W/ALOE PF LF 8 STRL

## (undated) DEVICE — 3M™ STERI-STRIP™ REINFORCED ADHESIVE SKIN CLOSURES, R1547, 1/2 IN X 4 IN (12 MM X 100 MM), 6 STRIPS/ENVELOPE: Brand: 3M™ STERI-STRIP™

## (undated) DEVICE — SUT GUT CHRM 1 CTX 36IN 905H

## (undated) DEVICE — MAT PREVALON MOBL TRANSFR AIR WO/PAD 39X80IN

## (undated) DEVICE — CLTH CLENS READYCLEANSE PERI CARE PK/5

## (undated) DEVICE — COATED VICRYL  (POLYGLACTIN 910) SUTURE, VIOLET BRAIDED, STERILE, SYNTHETIC ABSORBABLE SUTURE: Brand: COATED VICRYL

## (undated) DEVICE — BOWL UTIL STRL 32OZ